# Patient Record
Sex: MALE | Race: WHITE | NOT HISPANIC OR LATINO | Employment: OTHER | URBAN - METROPOLITAN AREA
[De-identification: names, ages, dates, MRNs, and addresses within clinical notes are randomized per-mention and may not be internally consistent; named-entity substitution may affect disease eponyms.]

---

## 2020-03-17 ENCOUNTER — HISTORICAL (OUTPATIENT)
Dept: ADMINISTRATIVE | Facility: HOSPITAL | Age: 80
End: 2020-03-17

## 2020-03-24 ENCOUNTER — HISTORICAL (OUTPATIENT)
Dept: ADMINISTRATIVE | Facility: HOSPITAL | Age: 80
End: 2020-03-24

## 2020-04-01 ENCOUNTER — HISTORICAL (OUTPATIENT)
Dept: ADMINISTRATIVE | Facility: HOSPITAL | Age: 80
End: 2020-04-01

## 2020-04-08 ENCOUNTER — HISTORICAL (OUTPATIENT)
Dept: ADMINISTRATIVE | Facility: HOSPITAL | Age: 80
End: 2020-04-08

## 2020-04-15 ENCOUNTER — HISTORICAL (OUTPATIENT)
Dept: ADMINISTRATIVE | Facility: HOSPITAL | Age: 80
End: 2020-04-15

## 2020-04-29 ENCOUNTER — HISTORICAL (OUTPATIENT)
Dept: ADMINISTRATIVE | Facility: HOSPITAL | Age: 80
End: 2020-04-29

## 2020-05-13 ENCOUNTER — HISTORICAL (OUTPATIENT)
Dept: ADMINISTRATIVE | Facility: HOSPITAL | Age: 80
End: 2020-05-13

## 2021-01-13 ENCOUNTER — HISTORICAL (OUTPATIENT)
Dept: ADMINISTRATIVE | Facility: HOSPITAL | Age: 81
End: 2021-01-13

## 2021-01-20 ENCOUNTER — HISTORICAL (OUTPATIENT)
Dept: ADMINISTRATIVE | Facility: HOSPITAL | Age: 81
End: 2021-01-20

## 2021-08-21 ENCOUNTER — HOSPITAL ENCOUNTER (OUTPATIENT)
Dept: MEDSURG UNIT | Facility: HOSPITAL | Age: 81
End: 2021-08-24
Attending: STUDENT IN AN ORGANIZED HEALTH CARE EDUCATION/TRAINING PROGRAM | Admitting: PHYSICIAN ASSISTANT

## 2021-08-21 LAB
ABS NEUT (OLG): 7.45 X10(3)/MCL (ref 2.1–9.2)
ALBUMIN SERPL-MCNC: 3.5 GM/DL (ref 3.4–4.8)
ALBUMIN/GLOB SERPL: 1 RATIO (ref 1.1–2)
ALP SERPL-CCNC: 214 UNIT/L (ref 40–150)
ALT SERPL-CCNC: 24 UNIT/L (ref 0–55)
APPEARANCE, UA: CLEAR
APTT PPP: 35.8 SECOND(S) (ref 23.2–33.7)
AST SERPL-CCNC: 31 UNIT/L (ref 5–34)
BACTERIA SPEC CULT: ABNORMAL /HPF
BASOPHILS # BLD AUTO: 0 X10(3)/MCL (ref 0–0.2)
BASOPHILS NFR BLD AUTO: 0 %
BILIRUB SERPL-MCNC: 1.5 MG/DL
BILIRUB UR QL STRIP: ABNORMAL
BILIRUBIN DIRECT+TOT PNL SERPL-MCNC: 0.6 MG/DL (ref 0–0.5)
BILIRUBIN DIRECT+TOT PNL SERPL-MCNC: 0.9 MG/DL (ref 0–0.8)
BUN SERPL-MCNC: 18.3 MG/DL (ref 8.4–25.7)
CALCIUM SERPL-MCNC: 9.7 MG/DL (ref 8.8–10)
CHLORIDE SERPL-SCNC: 98 MMOL/L (ref 98–107)
CO2 SERPL-SCNC: 25 MMOL/L (ref 23–31)
COLOR UR: ABNORMAL
CREAT SERPL-MCNC: 1.27 MG/DL (ref 0.73–1.18)
EOSINOPHIL # BLD AUTO: 0.1 X10(3)/MCL (ref 0–0.9)
EOSINOPHIL NFR BLD AUTO: 1 %
ERYTHROCYTE [DISTWIDTH] IN BLOOD BY AUTOMATED COUNT: 14.8 % (ref 11.5–17)
GLOBULIN SER-MCNC: 3.5 GM/DL (ref 2.4–3.5)
GLUCOSE (UA): NEGATIVE
GLUCOSE SERPL-MCNC: 90 MG/DL (ref 82–115)
HCT VFR BLD AUTO: 41.5 % (ref 42–52)
HGB BLD-MCNC: 13.3 GM/DL (ref 14–18)
HGB UR QL STRIP: NEGATIVE
INR PPP: 1.2 (ref 0–1.3)
KETONES UR QL STRIP: NEGATIVE
LACTATE SERPL-SCNC: 0.8 MMOL/L (ref 0.5–2.2)
LEUKOCYTE ESTERASE UR QL STRIP: NEGATIVE
LIPASE SERPL-CCNC: 48 U/L
LYMPHOCYTES # BLD AUTO: 1.7 X10(3)/MCL (ref 0.6–4.6)
LYMPHOCYTES NFR BLD AUTO: 17 %
MCH RBC QN AUTO: 30.5 PG (ref 27–31)
MCHC RBC AUTO-ENTMCNC: 32 GM/DL (ref 33–36)
MCV RBC AUTO: 95.2 FL (ref 80–94)
MONOCYTES # BLD AUTO: 0.6 X10(3)/MCL (ref 0.1–1.3)
MONOCYTES NFR BLD AUTO: 6 %
NEUTROPHILS # BLD AUTO: 7.45 X10(3)/MCL (ref 2.1–9.2)
NEUTROPHILS NFR BLD AUTO: 75 %
NITRITE UR QL STRIP: NEGATIVE
PH UR STRIP: 5 [PH] (ref 5–9)
PLATELET # BLD AUTO: 213 X10(3)/MCL (ref 130–400)
PMV BLD AUTO: 11.2 FL (ref 9.4–12.4)
POTASSIUM SERPL-SCNC: 4.6 MMOL/L (ref 3.5–5.1)
PROT SERPL-MCNC: 7 GM/DL (ref 5.8–7.6)
PROT UR QL STRIP: NEGATIVE
PROTHROMBIN TIME: 15.1 SECOND(S) (ref 12.5–14.5)
RBC # BLD AUTO: 4.36 X10(6)/MCL (ref 4.7–6.1)
RBC #/AREA URNS HPF: ABNORMAL /[HPF]
SARS-COV-2 RNA RESP QL NAA+PROBE: NOT DETECTED
SODIUM SERPL-SCNC: 135 MMOL/L (ref 136–145)
SP GR UR STRIP: 1.02 (ref 1–1.03)
SQUAMOUS EPITHELIAL, UA: ABNORMAL /HPF (ref 0–4)
TROPONIN I SERPL-MCNC: <0.01 NG/ML (ref 0–0.04)
UROBILINOGEN UR STRIP-ACNC: 1
WBC # SPEC AUTO: 9.9 X10(3)/MCL (ref 4.5–11.5)
WBC #/AREA URNS HPF: ABNORMAL /HPF

## 2021-08-22 LAB
ABS NEUT (OLG): 5.05 X10(3)/MCL (ref 2.1–9.2)
ALBUMIN SERPL-MCNC: 3.1 GM/DL (ref 3.4–4.8)
ALBUMIN/GLOB SERPL: 0.8 RATIO (ref 1.1–2)
ALP SERPL-CCNC: 185 UNIT/L (ref 40–150)
ALT SERPL-CCNC: 19 UNIT/L (ref 0–55)
AST SERPL-CCNC: 24 UNIT/L (ref 5–34)
BASOPHILS # BLD AUTO: 0 X10(3)/MCL (ref 0–0.2)
BASOPHILS NFR BLD AUTO: 1 %
BILIRUB SERPL-MCNC: 1 MG/DL
BILIRUBIN DIRECT+TOT PNL SERPL-MCNC: 0.5 MG/DL (ref 0–0.5)
BILIRUBIN DIRECT+TOT PNL SERPL-MCNC: 0.5 MG/DL (ref 0–0.8)
BUN SERPL-MCNC: 18.2 MG/DL (ref 8.4–25.7)
CALCIUM SERPL-MCNC: 9.5 MG/DL (ref 8.8–10)
CHLORIDE SERPL-SCNC: 103 MMOL/L (ref 98–107)
CO2 SERPL-SCNC: 22 MMOL/L (ref 23–31)
CREAT SERPL-MCNC: 1.12 MG/DL (ref 0.73–1.18)
EOSINOPHIL # BLD AUTO: 0.2 X10(3)/MCL (ref 0–0.9)
EOSINOPHIL NFR BLD AUTO: 3 %
ERYTHROCYTE [DISTWIDTH] IN BLOOD BY AUTOMATED COUNT: 14.8 % (ref 11.5–17)
GLOBULIN SER-MCNC: 3.8 GM/DL (ref 2.4–3.5)
GLUCOSE SERPL-MCNC: 76 MG/DL (ref 82–115)
HCT VFR BLD AUTO: 38.5 % (ref 42–52)
HGB BLD-MCNC: 12.3 GM/DL (ref 14–18)
LYMPHOCYTES # BLD AUTO: 1.3 X10(3)/MCL (ref 0.6–4.6)
LYMPHOCYTES NFR BLD AUTO: 18 %
MAGNESIUM SERPL-MCNC: 2 MG/DL (ref 1.6–2.6)
MCH RBC QN AUTO: 30.6 PG (ref 27–31)
MCHC RBC AUTO-ENTMCNC: 31.9 GM/DL (ref 33–36)
MCV RBC AUTO: 95.8 FL (ref 80–94)
MONOCYTES # BLD AUTO: 0.5 X10(3)/MCL (ref 0.1–1.3)
MONOCYTES NFR BLD AUTO: 7 %
NEUTROPHILS # BLD AUTO: 5.05 X10(3)/MCL (ref 2.1–9.2)
NEUTROPHILS NFR BLD AUTO: 71 %
PHOSPHATE SERPL-MCNC: 2.6 MG/DL (ref 2.3–4.7)
PLATELET # BLD AUTO: 190 X10(3)/MCL (ref 130–400)
PMV BLD AUTO: 10.7 FL (ref 9.4–12.4)
POTASSIUM SERPL-SCNC: 4.5 MMOL/L (ref 3.5–5.1)
PROT SERPL-MCNC: 6.9 GM/DL (ref 5.8–7.6)
RBC # BLD AUTO: 4.02 X10(6)/MCL (ref 4.7–6.1)
SODIUM SERPL-SCNC: 137 MMOL/L (ref 136–145)
WBC # SPEC AUTO: 7.2 X10(3)/MCL (ref 4.5–11.5)

## 2021-08-23 LAB
ABS NEUT (OLG): 5.97 X10(3)/MCL (ref 2.1–9.2)
BASOPHILS # BLD AUTO: 0.1 X10(3)/MCL (ref 0–0.2)
BASOPHILS NFR BLD AUTO: 1 %
BUN SERPL-MCNC: 16 MG/DL (ref 8.4–25.7)
CALCIUM SERPL-MCNC: 9.6 MG/DL (ref 8.8–10)
CHLORIDE SERPL-SCNC: 102 MMOL/L (ref 98–107)
CO2 SERPL-SCNC: 25 MMOL/L (ref 23–31)
CREAT SERPL-MCNC: 1.09 MG/DL (ref 0.73–1.18)
CREAT/UREA NIT SERPL: 15
EOSINOPHIL # BLD AUTO: 0.4 X10(3)/MCL (ref 0–0.9)
EOSINOPHIL NFR BLD AUTO: 4 %
ERYTHROCYTE [DISTWIDTH] IN BLOOD BY AUTOMATED COUNT: 14.6 % (ref 11.5–17)
FOLATE SERPL-MCNC: 17.1 NG/ML (ref 7–31.4)
GLUCOSE SERPL-MCNC: 73 MG/DL (ref 82–115)
HCT VFR BLD AUTO: 40.8 % (ref 42–52)
HGB BLD-MCNC: 13 GM/DL (ref 14–18)
LYMPHOCYTES # BLD AUTO: 1.2 X10(3)/MCL (ref 0.6–4.6)
LYMPHOCYTES NFR BLD AUTO: 14 %
MAGNESIUM SERPL-MCNC: 2 MG/DL (ref 1.6–2.6)
MCH RBC QN AUTO: 30.4 PG (ref 27–31)
MCHC RBC AUTO-ENTMCNC: 31.9 GM/DL (ref 33–36)
MCV RBC AUTO: 95.3 FL (ref 80–94)
MONOCYTES # BLD AUTO: 0.6 X10(3)/MCL (ref 0.1–1.3)
MONOCYTES NFR BLD AUTO: 8 %
NEUTROPHILS # BLD AUTO: 5.97 X10(3)/MCL (ref 2.1–9.2)
NEUTROPHILS NFR BLD AUTO: 73 %
PLATELET # BLD AUTO: 241 X10(3)/MCL (ref 130–400)
PMV BLD AUTO: 10.6 FL (ref 9.4–12.4)
POTASSIUM SERPL-SCNC: 4.4 MMOL/L (ref 3.5–5.1)
RBC # BLD AUTO: 4.28 X10(6)/MCL (ref 4.7–6.1)
SODIUM SERPL-SCNC: 136 MMOL/L (ref 136–145)
TSH SERPL-ACNC: 2.57 UIU/ML (ref 0.35–4.94)
WBC # SPEC AUTO: 8.2 X10(3)/MCL (ref 4.5–11.5)

## 2021-08-26 LAB — FINAL CULTURE: NORMAL

## 2021-08-27 LAB — FINAL CULTURE: NORMAL

## 2021-10-19 ENCOUNTER — HISTORICAL (OUTPATIENT)
Dept: ADMINISTRATIVE | Facility: HOSPITAL | Age: 81
End: 2021-10-19

## 2021-10-19 LAB — SARS-COV-2 RNA RESP QL NAA+PROBE: NEGATIVE

## 2022-04-30 NOTE — ED PROVIDER NOTES
Patient:   Jovany Reynolds            MRN: [id number]            FIN: [id number]               Age:   80 years     Sex:  Male     :  1940   Associated Diagnoses:   Pneumoperitoneum; Diverticulosis; Abdominal pain   Author:   Eduardo NUNES, Andrew BARRIOS      Report Summary       General:       Alert, no acute distress.       Basic Information   Time seen: Date & time 2021 12:36:00.   History source: Patient.   Arrival mode: Private vehicle.   History limitation: None.   Additional information: Chief Complaint from Nursing Triage Note : Chief Complaint   2021 12:28 CDT      Chief Complaint           c/o abdominal pain that started lower right abdominal pain that has moved around middle abdomen.  Pt had colonoscopy thursday with increased pain after.    2021 11:37 CDT      Chief Complaint           abdominal pain after colonscopy thursday (constant pain)  .      History of Present Illness   The patient presents with 80-year-old  male with a history of CVA in  and aortic valve replacement in 2017 with quadruple bypass surgery, presents to the emergency department with wife complaining of increased lower abdominal pain post colonoscopy on Thursday (2021) with Dr. Tino Anguiano.  Initial Oxygen saturation in triage is 91% on room air.  Patient denies any respiratory complaints at this time.  Patient placed on 2 L nasal cannula which increased oxygen saturation to 95%.  Wife reports patient takes a baby aspirin daily ~Sort note by SHARYN Bowen NP.  The onset was 3  days ago.  The course/duration of symptoms is constant and worsening.  The character of symptoms is dull and pressure.  The degree at onset was minimal.  The Location of pain at onset was right, lower and abdominal.  The degree at present is severe.  The Location of pain at present is mid epigastric.  Radiating pain: none. The exacerbating factor is eating.  There are relieving factors including analgesics and antacids.  Therapy  today: none.  Risk factors consist of age and recent surgery.  Associated symptoms: shortness of breath, denies nausea, denies vomiting and denies diarrhea.  80-year-old male with a prior history of CVA as well as status post AVR in 2017 had four-vessel CABG here 3 days after recent colonoscopy with Dr. Tino Jensen with gastro noting increasing abdominal pain.  He states that his first bowel movement was very small and hard yesterday and has had gas last night but notes that the pain started right lower quadrant just after the colonoscopy and has moved periumbilical since then.  Denies any nausea, vomiting or fever.  Denies any prior abdominal surgeries.  States the pain improves only temporarily with Gas-X and Tylenol.  Notably, his oxygen was at 91% on arrival and denies any chest pain but does note some difficulty taking deep breaths secondary to pain on inspiration in the abdomen..        Review of Systems   Constitutional symptoms:  Negative except as documented in HPI.   Skin symptoms:  Negative except as documented in HPI.   Eye symptoms:  Negative except as documented in HPI.   ENMT symptoms:  Negative except as documented in HPI.   Respiratory symptoms:  Negative except as documented in HPI.   Cardiovascular symptoms:  Negative except as documented in HPI.   Gastrointestinal symptoms:  Negative except as documented in HPI.   Genitourinary symptoms:  Negative except as documented in HPI.   Musculoskeletal symptoms:  Negative except as documented in HPI.   Neurologic symptoms:  Negative except as documented in HPI.   Psychiatric symptoms:  Negative except as documented in HPI.   Endocrine symptoms:  Negative except as documented in HPI.   Hematologic/Lymphatic symptoms:  Negative except as documented in HPI.   Allergy/immunologic symptoms:  Negative except as documented in HPI.      Health Status   Allergies:    Allergic Reactions (Selected)  No Known Allergies,    Allergies (1) Active Reaction  No Known  Allergies None Documented  .   Medications:  (Selected)   Inpatient Medications  Ordered  Zofran 2 mg/mL injectable solution: 4 mg, form: Injection, IV Push, Once, first dose 08/21/21 12:32:00 CDT, stop date 08/21/21 12:32:00 CDT, STAT  morphine 4 mg/mL preservative-free intravenous solution: 2 mg, form: Injection, IV Push, Once, first dose 08/21/21 12:32:00 CDT, stop date 08/21/21 12:32:00 CDT, STAT, ( > 7 on pain scale)  Prescriptions  Prescribed  ASPIRIN 81MG EC LOW DOSE D/F TABS: See Instructions, TAKE 1 TABLET BY MOUTH EVERY DAY, # 120 tab(s), 3 Refill(s), eRx: [facility] [id number]  Lexapro 10 mg oral tablet: 10 mg = 1 tab(s), Oral, Daily, # 30 tab(s), 1 Refill(s), Pharmacy: [facility] [id number]  cefadroxil 500 mg oral capsule: 500 mg = 1 cap(s), Oral, q12hr, # 20 cap(s), 0 Refill(s), Pharmacy: [facility] #[id number], 163, cm, Height/Length Dosing, 06/26/19 15:31:00 CDT, 68.5, kg, Weight Dosing, 06/26/19 15:31:00 CDT  ezetimibe 10 mg oral tablet: See Instructions, TAKE 1 TABLET BY MOUTH DAILY, # 30 tab(s), 11 Refill(s), eRx: [facility] #[id number]  gabapentin 100 mg oral capsule: 100 mg = 1 cap(s), Oral, BID, PRN PRN pain, moderate, for sciatica pain, # 28 cap(s), 0 Refill(s), Pharmacy: [facility] [id number]  levothyroxine 50 mcg (0.05 mg) oral tablet: See Instructions, TAKE 1 TABLET BY MOUTH DAILY, # 30 tab(s), 0 Refill(s), Pharmacy: [facility] #[id number], 163, cm, Height/Length Dosing, 06/26/19 15:31:00 CDT, 68.5, kg, Weight Dosing, 06/26/19 15:31:00 CDT  traZODONE 150 mg oral tab ( Desyrel ): See Instructions, TAKE 1/3- 1 TABLET BY MOUTH AT BEDTIME, # 30 tab(s), 11 Refill(s), Pharmacy: [facility] [id number]  Documented Medications  Documented  ASPIRIN 81MG EC LOW DOSE D/F TABS: 81 mg = 1 tab(s), Oral, Daily  CLOPIDOGREL 75MG TABLETS: 75 mg = 1 tab(s), Oral, Daily  CoQ10 300 mg oral capsule: 600 mg = 2 cap(s), Oral, Daily, # 100 cap(s), 0 Refill(s)  FUROSEMIDE 40MG  TABLETS: 40 mg = 1 tab(s), Oral, Daily  METOPROLOL ER SUCCINATE 25MG TABS: 25 mg = 1 tab(s), Oral, Daily  RAPAFLO 8 MG CAPSULE: 8 mg = 1 cap(s), Oral, Daily  Vitamin D3 400 intl units oral tablet: 400 IntUnit = 1 tab(s), Oral, Daily, 0 Refill(s)  dicyclomine 10 mg oral capsule: 10 mg = 1 cap(s), Oral, q12hr  memantine 10 mg oral tablet: 10 mg = 1 tab(s), Oral, BID  traZODONE 50 mg oral tablet ( Desyrel ): 75 mg = 1.5 tab(s), Oral, qPM.      Past Medical/ Family/ Social History   Medical history:    Resolved  Need for pneumococcal vaccine ([id number]):  Resolved..   Surgical history:    Colonoscopy ([id number]) on 8/19/2021 at 80 Years.  Carotid endarterectomy ([id number]) in 2015 at 75 Years.  Colonoscopy ([id number]) in the month of 10/2014 at 73 Years.  Bilateral replacement of knee joints ([id number]) in 2011 at 71 Years.  Arthroplasty of right shoulder ([id number]) in 2003 at 63 Years.  left shoulder surgery in 2003 at 63 Years..   Family history:    CAD - Coronary artery disease  Father  Mother  .      Physical Examination               Vital Signs   Vital Signs   8/21/2021 12:28 CDT      Temperature Oral          37.0 DegC                             Temperature Oral (calculated)             98.60 DegF                             Peripheral Pulse Rate     90 bpm                             Respiratory Rate          18 br/min                             SpO2                      91 %  LOW                             Oxygen Therapy            Room air, Nasal cannula                             Oxygen Flow Rate          2 L/min                             Systolic Blood Pressure   100 mmHg                             Diastolic Blood Pressure  63 mmHg    8/21/2021 11:37 CDT      Temperature Tympanic      36.9 DegC                             Peripheral Pulse Rate     68 bpm                             Respiratory Rate          18 br/min                             SpO2                      98 %                              Oxygen Therapy            Room air                             Systolic Blood Pressure   98 mmHg                             Diastolic Blood Pressure  76 mmHg                             Blood Pressure Location   Left arm                             Manual Cuff BP            No  .   Oxygen saturation.   General:  Alert, no acute distress.    Skin:  Warm, dry, no rash.    Head:  Normocephalic, atraumatic.    Neck:  Supple, trachea midline, no JVD.    Eye:  Pupils are equal, round and reactive to light, extraocular movements are intact, normal conjunctiva, vision unchanged.    Ears, nose, mouth and throat:  Tympanic membranes clear, oral mucosa moist, no pharyngeal erythema or exudate.    Cardiovascular:  Regular rate and rhythm, No murmur, No edema.    Respiratory:  Lungs are clear to auscultation, respirations are non-labored, breath sounds are equal, Symmetrical chest wall expansion.    Chest wall:  No tenderness, sternotomy scar present.    Back:  Normal range of motion.   Musculoskeletal:  Normal ROM, normal strength, no tenderness.    Gastrointestinal:  Soft, Non distended, No organomegaly, Tenderness: Moderate, epigastric, periumbilical, Guarding: Minimal, Rebound: Negative, Bowel sounds: Normal.    Neurological:  Alert and oriented to person, place, time, and situation, No focal neurological deficit observed, CN II-XII intact, normal sensory observed, normal motor observed, normal speech observed.    Psychiatric:  Cooperative, appropriate mood & affect, normal judgment.             Medical Decision Making   Differential Diagnosis:  Abdominal pain, bowel perforation, abdominal aortic aneurysm.    Rationale:  Elderly male 4 days status post colonoscopy with initially rather proximal abdominal pain which has changed to periumbilical of which she has moderately tender in this area on exam.  Normal bowel sounds and does note recent flatus.  Vital signs with some mild hypoxia stating that it hurts to take a  deep breath but not tachycardic, labs are no leukocytosis.  No lactic acid and no EKG so mesenteric ischemia less likely.  Rest of his labs relatively unremarkable.  There was some note on the CT of pneumoperitoneum of which surgical hospitalist was able to evaluate patient and will do repeat abdominal exams as consult with admission to hospitalist.  Else any evidence of urinary tract infection because there was some possible note of possible bladder diverticulum/fistula.  Covid negative.  Given a dose of morphine and Zofran for pain control as well as Zosyn for possible infection..   Electrocardiogram:  Time 8/21/2021 13:09:00, rate 63, Sinus rhythm with first-degree AV block, right bundle-branch block, anterior ST depression without evidence of significant ST elevation..       Impression and Plan   Diagnosis   Pneumoperitoneum (RWL54-RN K66.8)   Diverticulosis (RVQ11-SM K57.90)   Abdominal pain (PNED [id number])      Calls-Consults   -  Surgical hospitalist at 1518  Hospitalist regarding admission at 1623.   Plan   Condition: Improved, Stable.    Counseled: Patient, Family, Regarding diagnosis, Regarding diagnostic results, Regarding treatment plan, Patient indicated understanding of instructions.

## 2022-05-04 NOTE — HISTORICAL [facility] CERNER
This is a historical note converted from Cerday. Formatting and pictures may have been removed.  Please reference Cerday for original formatting and attached multimedia. Chief Complaint  c/o abdominal pain that started lower right abdominal pain that has moved around middle abdomen. ?Pt had colonoscopy thursday with increased pain after.  Reason for Consultation  Scant pneumoperitoneum on CT  History of Present Illness  Jovany Reynolds is an 81 yo M w/ history of diverticulosis who presented to the ED today w/ abdominal pain since having a colonoscopy w/ Dr. Anguiano 3 days ago. Colonoscopy was significant for sigmoid diverticulosis and a colonic loop that was difficult to traverse endoscopically requiring significant insufflation to do so. He reports an episode of what he believes was diverticulitis about a year ago, where he had abdominal pain that resolved w/ antibiotics given by his PCP. He reports the abdominal pain was initially localized to the RLQ, and is now primarily suprapubic. He denies any fever or chills, nausea or vomiting, or difficulty w/?PO intake at home. Has been having normal BM since the colonoscopy. Denies any blood in his stool. Denies history of UTI or burning w/ urination. Does endorse some pain w/ urination that he relates to his prostate. No history of previous abdominal surgeries.  ?  CT scan was obtained in ED showing scant pneumoperitoneum in the RUQ, bladder wall thickening, possible fistulous tract from the underside of the sigmoid to posterior wall of the bladder, colonic diverticulosis, but no diverticulitis. Patient afebrile, not tachycardic, w/ normal WBC and UA negative for nitrites, LE, or bacteria.  Review of Systems  Negative except as mentioned above  Physical Exam  Vitals & Measurements  T:?37.0? ?C (Oral)? HR:?62(Peripheral)? HR:?61(Monitored)? RR:?18? BP:?93/59? SpO2:?97%? WT:?68?kg?  Gen: NAD  Neuro: No focal deficit, R CEA scar well healed  Card: RR  Pulm: NWOB on 2L  NC  GI: S,?ND, mildly tender to palpation in suprapubic region  MS: Moves all extremities  Integ: WWP, no obvious lesions?  Assessment/Plan  Jovany Reynolds is an 81 yo M w/ history of diverticulosis s/p colonoscopy 3 days ago w/ scant pneumoperitoneum and possible fistulous tract from sigmoid to bladder on CT.  ?   -Case discussed w/ Dr. Anguiano, he believes scant pneumoperitoneum likely secondary to colonoscopy requiring use of significant insufflation  -Patient afebrile, hemodynamically stable w/o leukocytosis, abdominal exam benign at this time, no need for acute surgical intervention  -Patient may require f/u w/ colorectal as outpatient for possible colovesical fistula, has seen Dr. Lopez in the past for hemorrhoids, UA w/o evidence of current infection, denies previous UTIs  -No evidence of acute diverticulitis  -Please call if patient condition changes and surgical need arises  ?   Lj Fuchs MD  [employer] [facility], PGY-1  ?   Agree with above assessment and plan.   Problem List/Past Medical History  Ongoing  Atrial fibrillation  CAD - Coronary artery disease  CVA - Cerebrovascular accident  Depression  Generalized anxiety disorder  Heart murmur  History of shoulder surgery  Hypothyroidism  Insomnia  MDD (major depressive disorder)  Need for influenza vaccination  Short term memory loss  Vitamin B deficiency  Vitamin D deficiency  Procedure/Surgical History  Incision and drainage of hematoma, seroma or fluid collection (03/17/2020)  Carotid endarterectomy (2015)  Colonoscopy (10.2014)  Bilateral replacement of knee joints (2011)  Arthroplasty of right shoulder (2003)  left shoulder surgery (2003)   Medications  Home  ASPIRIN 81MG EC LOW DOSE D/F TABS, 81 mg= 1 tab(s), Oral, Daily  CoQ10 300 mg oral capsule, 600 mg= 2 cap(s), Oral, Daily  dicyclomine 10 mg oral capsule, 10 mg= 1 cap(s), Oral, q12hr  ezetimibe 10 mg oral tablet, See Instructions, 11 refills  FUROSEMIDE 40MG TABLETS, 40 mg= 1 tab(s), Oral,  Daily  levothyroxine 50 mcg (0.05 mg) oral tablet, See Instructions  Lexapro 10 mg oral tablet, 10 mg= 1 tab(s), Oral, Daily, 1 refills  memantine 10 mg oral tablet, 10 mg= 1 tab(s), Oral, BID  METOPROLOL ER SUCCINATE 25MG TABS, 25 mg= 1 tab(s), Oral, Daily  RAPAFLO 8 MG CAPSULE, 8 mg= 1 cap(s), Oral, Daily  traZODONE 50 mg oral tablet ( Desyrel ), 75 mg= 1.5 tab(s), Oral, qPM  Vitamin D3 400 intl units oral tablet, 400 IntUnit= 1 tab(s), Oral, Daily  Allergies  No Known Allergies  Social History  Alcohol  Current, Beer, 1-2 times per month, 08/03/2018  Substance Use  Never, 08/03/2018  Tobacco  Never (less than 100 in lifetime), No, 08/21/2021  Family History  CAD - Coronary artery disease: Mother and Father.  Lab Results  Cr 1.27  WBC 9.9  UA w/ negative nitrites, LE, bacteria  Diagnostic Results  CT A/P: Small volume scattered pneumoperitoneum, greatest in the right upper quadrant. No free fluid.  Bladder wall thickening may relate to underdistention and/or infection. Colovesicular fistula is a possibility. Colonic diverticulosis. No diverticulitis.?      36.9

## 2022-05-04 NOTE — HISTORICAL [facility] CERNER
This is a historical note converted from Cerday. Formatting and pictures may have been removed.  Please reference Cerday for original formatting and attached multimedia. Chief Complaint  c/o abdominal pain that started lower right abdominal pain that has moved around middle abdomen. ?Pt had colonoscopy thursday with increased pain after.  Reason for Consultation  abdominal pain post colonoscopy with abnormal CT scan findings  History of Present Illness  80 year old male who presented to the ED after being seen at urgent care for abdominal pain post colonoscopy.? Colonoscopy done by me 8/19/2021.? Patient with notably tortuous sigmoid colon with a sharp angulation noted in the sigmoid colon around area of diverticula, with scaring and evidence of previous diverticulitis.? Patient did not have pain post procedure, but notes several hours after the procedure he had a dull right lower quadrant pain, which escalated in intensity after eating, but after a few hours the pain did begin to improve.? Today, he reports that the pain was no longer at the right lower quadrant, but located in the area just below his umbilicus, no radiation, associated with decreased urination.? Denies dysuria or hematuria.? He has had a bowel movement since colonoscopy and is passing flatus.? he denies fever, chills, nausea, vomiting.? He does not have a decreased appetite.? He does describe prior episodes of diverticulitis and he did have one he thinks about 1 year prior, this was diagnosed clinically by his PCP at the time and he did a course of antibiotics.?  Review of Systems  Constitutional:?no fever, fatigue  Eye:?no vision loss, eye redness, drainage, or pain  Respiratory:?no cough, no shortness of breath  Cardiovascular:?no chest pain, no palpitations  Gastrointestinal:?no nausea, vomiting, or diarrhea.?+ abdominal pain  Genitourinary:?no dysuria, no urinary frequency, + decreased urination  Endocrine:?no heat or cold  intolerance  Musculoskeletal:?no muscle or joint pain, no joint swelling  Integumentary:?no skin rash  Physical Exam  Vitals & Measurements  T:?37.0? ?C (Oral)? HR:?62(Peripheral)? HR:?61(Monitored)? RR:?18? BP:?93/59? SpO2:?97%? WT:?68?kg?  General:?well-developed well-nourished in no acute distress  Eye: EOMI, clear conjunctiva, eyelids normal  Respiratory:?clear to auscultation bilaterally  Cardiovascular:?regular rate and rhythm without murmurs  Gastrointestinal:?soft, non-distended with normal bowel sounds, mild suprapubic tenderness, no rebound tenderness or guarding  Neurologic: cranial nerves intact, grossly  Assessment/Plan  Abdominal pain?[id number]  As noted above, pain post colonoscopy, no fever or leukocytosis with normal lactic acid,?CT scan with 2 significant findings, including trace amount of air in the peritoneum, which is likely secondary to colonoscopy exam, however, his current pain is more consistent with cystitis noted on imaging and there is also concern for a colovesicular fistula, which I suspect is a chronic finding given his diverticula history and scaring/angulation noted at the?area of sigmoid diverticula seen during colonoscopy, with lack of inflammation seen at this site during colonoscopy and on current CT scan.? Agree with surgical consultation and surgical intern was present during my encounter.? Would keep patient NPO and agree with broad spectrum antibiotics, along with checking?blood and urine cultures.? He will need close observation over the next 24-48 hours with outpatient follow up for possible colovesicular fistula.? There were no polyps found?during colonoscopy and one hemorrhoid band was placed for internal hemorrhoids.? Please call with any questions or concerns.   Problem List/Past Medical History  Ongoing  Abnormal blood sugar  Atrial fibrillation  CAD - Coronary artery disease  CVA - Cerebrovascular accident  Depression  Generalized anxiety  disorder  Heart murmur  History of shoulder surgery  Hypothyroidism  Insomnia  MDD (major depressive disorder)  Need for influenza vaccination  Short term memory loss  Vitamin B deficiency  Vitamin D deficiency  Historical  Need for pneumococcal vaccine  Procedure/Surgical History  Colonoscopy (08/19/2021)  Debridement (eg, high pressure waterjet with/without suction, sharp selective debridement with scissors, scalpel and forceps), open wound, (eg, fibrin, devitalized epidermis and/or dermis, exudate, debris, biofilm), including topical application(s), wound (01/20/2021)  Extraction of Chest Skin, External Approach (01/20/2021)  Debridement (eg, high pressure waterjet with/without suction, sharp selective debridement with scissors, scalpel and forceps), open wound, (eg, fibrin, devitalized epidermis and/or dermis, exudate, debris, biofilm), including topical application(s), wound (01/13/2021)  Extraction of Chest Skin, External Approach (01/13/2021)  Debridement, subcutaneous tissue (includes epidermis and dermis, if performed); first 20 sq cm or less (04/08/2020)  Excision of Left Lower Leg Subcutaneous Tissue and Fascia, Open Approach (04/08/2020)  Debridement, subcutaneous tissue (includes epidermis and dermis, if performed); first 20 sq cm or less (04/01/2020)  Excision of Left Lower Leg Subcutaneous Tissue and Fascia, Open Approach (04/01/2020)  Debridement (eg, high pressure waterjet with/without suction, sharp selective debridement with scissors, scalpel and forceps), open wound, (eg, fibrin, devitalized epidermis and/or dermis, exudate, debris, biofilm), including topical application(s), wound (03/24/2020)  Extraction of Left Lower Leg Skin, External Approach (03/24/2020)  Drainage of Left Lower Leg Skin, External Approach (03/17/2020)  Incision and drainage of hematoma, seroma or fluid collection (03/17/2020)  Carotid endarterectomy (2015)  Colonoscopy (10.2014)  Bilateral replacement of knee joints  (2011)  Arthroplasty of right shoulder (2003)  left shoulder surgery (2003)   Medications  Inpatient  Zosyn 3.375 gm (for IVPB)  Home  ASPIRIN 81MG EC LOW DOSE D/F TABS, 81 mg= 1 tab(s), Oral, Daily  ASPIRIN 81MG EC LOW DOSE D/F TABS, See Instructions, 3 refills,? ?Not taking: duplicate  cefadroxil 500 mg oral capsule, 500 mg= 1 cap(s), Oral, q12hr,? ?Not taking  CoQ10 300 mg oral capsule, 600 mg= 2 cap(s), Oral, Daily  dicyclomine 10 mg oral capsule, 10 mg= 1 cap(s), Oral, q12hr  ezetimibe 10 mg oral tablet, See Instructions, 11 refills  FUROSEMIDE 40MG TABLETS, 40 mg= 1 tab(s), Oral, Daily  gabapentin 100 mg oral capsule, 100 mg= 1 cap(s), Oral, BID, PRN,? ?Not taking  levothyroxine 50 mcg (0.05 mg) oral tablet, See Instructions  Lexapro 10 mg oral tablet, 10 mg= 1 tab(s), Oral, Daily, 1 refills  memantine 10 mg oral tablet, 10 mg= 1 tab(s), Oral, BID  METOPROLOL ER SUCCINATE 25MG TABS, 25 mg= 1 tab(s), Oral, Daily  RAPAFLO 8 MG CAPSULE, 8 mg= 1 cap(s), Oral, Daily  traZODONE 150 mg oral tab ( Desyrel ), See Instructions, 11 refills  traZODONE 50 mg oral tablet ( Desyrel ), 75 mg= 1.5 tab(s), Oral, qPM  Vitamin D3 400 intl units oral tablet, 400 IntUnit= 1 tab(s), Oral, Daily  Allergies  No Known Allergies  Social History  Abuse/Neglect  No, 08/21/2021  No, 06/26/2019  Alcohol  Current, Beer, 1-2 times per month, 08/03/2018  Employment/School  Employed, Work/School description: Self emplyed, [employer] 08/03/2018  Home/Environment  Lives with Spouse. Living situation: Home/Independent., 08/03/2018  Nutrition/Health  Regular, 08/03/2018  Sexual  Sexually active: Yes., 09/12/2018  Substance Use  Never, 08/03/2018  Tobacco  Never (less than 100 in lifetime), No, 08/21/2021  Never (less than 100 in lifetime), N/A, 06/26/2019  Family History  CAD - Coronary artery disease: Mother and Father.  Immunizations  Vaccine Date Status   pneumococcal 13-valent conjugate vaccine 09/12/2018 Given   zoster vaccine, inactivated  09/11/2018 Recorded   tetanus-diphtheria toxoids 11/27/2017 Recorded   zoster vaccine live 11/12/2014 Recorded

## 2022-05-04 NOTE — HISTORICAL [facility]
This is a historical note converted from [employer]. Formatting and pictures may have been removed.  Please reference [employer] for original formatting and attached multimedia. 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  Immunizations  No immunizations recorded for this visit.  Hospital Course  Subjective  Patient is an 80-year-old  male with a medical history of?atrial fibrillation, coronary artery disease, hypothyroidism and prior CVA?who presents to the ED with complaints of?abdominal pain.?Abdominal pain has been present since colonoscopy on 8/19/2021 with Dr. Anguiano. Pain originally located to the RLQ but has progressed to the periumbilical region. He reports flatus, shortness of breath and decreased urination.? He denies fever, nausea, vomiting and diarrhea. Colonoscopy reveled tortuous sigmoid colon with sharp angulation in the area of diverticula,?internal hemorrhoid which was banded.  Upon presentation to the ED? he was afebrile and hemodynamically stable.?Oxygen saturation 91% room air? and?placed on 2 L nasal cannula with improvement. Since he has been weaned to room air. Labs revealed WBC 9.9, lactic acid 0.8 lipase 48, troponin less than 0.01, creatinine 1.27, direct bilirubin 0.6, indirect bilirubin 0.90, PT 15.1 and PTT 35.8. Other lab indices of the CBC and CMP were overall unremarkable.? UA negative for infectious process. SARS-CoV-2 PCR negative.? EKG normal sinus rhythm.? CT abdomen  pelvis significant for pneumoperitoneum concerning for perforated viscus, bladder wall thickening related to underdistention and/or infection, colovesicular fistula possibility and colonic diverticulosis without diverticulitis. Blood cultures pending.? He received morphine and Zosyn in the ED.? Surgical hospitalist and GI was consulted and planning for repeat abdominal exams and broad spectrum antibiotics. Patient is admitted to hospital medicine services for further medical management.  Pt was Admitted as inpatient to our service on 8/21  Surgical hospitalist consulted?on admission--> no emergent intervention indicated at this time, appreciate recs  GI Dr JAKI Anguiano consulted. recommended d/c on?Augmentin for 7 days. Appreciate recommendations  Continue with Zosyn while inhouse, plan for d/c on augmentin x 7 days  2/2 blood cx - positive for Gram Positive Cocci probable Staphylococcus Warneri, Briefly discussed with our ID, probably contamination, Pt remained afebrile, no leukocytosis, no chills while in house. Pt did received 2 days of vanc empirically given his prosthetic valve  Echo reviewed  Repeat Blood cx 8/22- negtiave  Pt is stable to be discharged home today.  ?   Pt was seen and examined on the day of discharge  Vital signs reviewed  Vitals can be found below  General: In no acute distress, afebrile  Chest: Clear to auscultation bilaterally?anteriorly  Heart: S1, S2, 2/6 systolic murmur  Abdomen: mild guarding on palpation?of upper abdomen. BS +ve x4  MSK: Warm, no lower extremity edema, no clubbing or cyanosis  Neurologic: Alert and oriented x4, moving all extremities with good strength  ?   Explained in detail to the patient about the discharge plan, medications, and follow-up?visits.? Pt understand and agree with the treatment plan.  Condition stable  Diet- cardiac  Medications Per DC med rec  Activities as tolerated  Follow-up with PCP in 1 to 2 weeks  F/U with Dr Tino Chance in 10-14 days  For further  questions contact hospitalist office  ?   Discharge time 36 minutes   Discharge Medication Reconciliation  Prescribed  amoxicillin-clavulanate (Augmentin 875 mg-125 mg oral tablet)?1 tab(s), Oral, q12hr  traZODone (traZODONE 50 mg oral tablet ( Desyrel ))?50 mg, Oral, Once a day (at bedtime)  Continue  Misc Prescription (ASPIRIN 81MG EC LOW DOSE D/F TABS)?See Instructions  aspirin (ASPIRIN 81MG EC LOW DOSE D/F TABS)?81 mg, Oral, Daily  cholecalciferol (Vitamin D3 400 intl units oral tablet)?400 IntUnit, Oral, Daily  escitalopram (Lexapro 10 mg oral tablet)?10 mg, Oral, Daily  ezetimibe (ezetimibe 10 mg oral tablet)?See Instructions  furosemide (FUROSEMIDE 40MG TABLETS)?40 mg, Oral, Daily  gabapentin (gabapentin 100 mg oral capsule)?100 mg, Oral, BID, PRN pain, moderate  levothyroxine (levothyroxine 50 mcg (0.05 mg) oral tablet)?See Instructions  memantine (memantine 10 mg oral tablet)?10 mg, Oral, BID  metoprolol (METOPROLOL ER SUCCINATE 25MG TABS)?25 mg, Oral, Daily  silodosin (RAPAFLO 8 MG CAPSULE)?8 mg, Oral, Daily  Discontinue  cefadroxil (cefadroxil 500 mg oral capsule)?500 mg, Oral, q12hr  clopidogrel (CLOPIDOGREL 75MG TABLETS)?75 mg, Oral, Daily  dicyclomine (dicyclomine 10 mg oral capsule)?10 mg, Oral, q12hr  traZODone (traZODONE 150 mg oral tab ( Desyrel ))?See Instructions  traZODone (traZODONE 50 mg oral tablet ( Desyrel ))?75 mg, Oral, qPM  ubiquinone (CoQ10 300 mg oral capsule)?600 mg, Oral, Daily  Education and Orders Provided  Hemorrhoids  Diverticulosis  Discharge - 08/24/21 7:48:00 CDT, Home, Give all scheduled vaccinations prior to discharge.?  Discharge Activity - Activity as Tolerated?  Discharge Diet - Cardiac?  Follow up  Aj Gomez MD, within 2 to 4 weeks  Tino Anguiano, on 09/08/2021  Car Seat Challenge  No Qualifying Data

## 2022-05-04 NOTE — HISTORICAL [facility]
This is a historical note converted from [employer]. Formatting and pictures may have been removed.  Please reference [employer] for original formatting and attached multimedia. Chief Complaint  c/o abdominal pain that started lower right abdominal pain that has moved around middle abdomen. ?Pt had colonoscopy thursday with increased pain after.  History of Present Illness  Patient is an 80-year-old  male with a medical history of?atrial fibrillation, coronary artery disease, hypothyroidism and prior CVA?who presents to the ED with complaints of?abdominal pain.?Abdominal pain has been present since colonoscopy on 8/19/2021 with Dr. Anguiano. Pain originally located to the RLQ but has progressed to the periumbilical region. He reports flatus, shortness of breath and decreased urination.? He denies fever, nausea, vomiting and diarrhea. Colonoscopy reveled tortuous sigmoid colon with sharp angulation in the area of diverticula,?internal hemorrhoid which was banded.  Upon presentation to the ED? he was afebrile and hemodynamically stable.?Oxygen saturation 91% room air? and?placed on 2 L nasal cannula with improvement. Since he has been weaned to room air. Labs revealed WBC 9.9, lactic acid 0.8 lipase 48, troponin less than 0.01, creatinine 1.27, direct bilirubin 0.6, indirect bilirubin 0.90, PT 15.1 and PTT 35.8. Other lab indices of the CBC and CMP were overall unremarkable.? UA negative for infectious process. SARS-CoV-2 PCR negative.? EKG normal sinus rhythm.? CT abdomen pelvis significant for pneumoperitoneum concerning for perforated viscus, bladder wall thickening related to underdistention and/or infection, colovesicular fistula possibility and colonic diverticulosis without diverticulitis. Blood cultures pending.? He received morphine and Zosyn in the ED.? Surgical hospitalist and GI was consulted and planning for repeat abdominal exams and broad spectrum antibiotics. Patient is admitted to hospital medicine  services for further medical management.  Review of Systems  Except as documented all systems reviewed and negative  Physical Exam  Vitals & Measurements  T:?37.0? ?C (Oral)? HR:?62(Peripheral)? HR:?61(Monitored)? RR:?18? BP:?93/59? SpO2:?97%? WT:?68?kg?  General:?NAD, nontoxic appearing  Eye: PERRLA clear conjunctiva  HENT:?moist mucus membranes, normocephalic  Neck: full range of motion  Respiratory:?clear to auscultation bilaterally, symmetrical chest expansion, unlabored respirations  Cardiovascular:?regular rate and rhythm, brisk capillary refill  Gastrointestinal:?soft, non-distended, active bowel sounds  Genitourinary: no CVA tenderness to palpation  Musculoskeletal:?full range of motion of all extremities  Integumentary: warm and dry  Neurologic: AAOx3, cranial nerves intact, no focal deficits  Psych: calm, cooperative,?good eye contact, normal cognition  Assessment/Plan  IMPRESSION:  Pneumoperitoneum - likely secondary to colonoscopy  Sigmoid Colon Diverticula  Colovesicular Fistula  Abdominal Pain  Hx: Atrial Fibrillation, CAD, Hypothyroidism, AVR  ?  ?   PLAN:  - Surgical hospitalist and GI consulted. Appreciate recommendations  - Continue with Zosyn  - Follow results of blood and urine cultures  - NPO  - Zofran as needed  - Labs in AM  ?  ?   DVT Prophylaxis: SCDs  ?  ?   Source of history: Patient and medical records  Referral source: ED  History limitation: None  ?  ?  PCP: Aj Gomez MD  ?  ?  ?  I, MARLENA Klein, reviewed and discussed the case with Dr. CHERIE Martin. See addendum for further per MD.?  ?  ?  **************************************ADDENDUM******************************************  I Dr. Martin?assumed care of this patient today?8/21/21.This patient was admitted to observation under my care. For this patient encounter I have reviewed the nurse practitioners documentation, treatment plan and medical decision making. ?I have had a face-to-face with this patient.? I  have?reviewed?all?medical records as made available?and pertinent to?this admission.  Patient tells me that his abdominal pain is more generalized. He has not been eating.? No nausea no vomiting no chest pain.? No shortness of breath.  Regular heart rate no peripheral edema. ?Sitting upright, abdominal tenderness to touch bowel sounds present?no rebound no guarding.? Moist mucous membranes?no petechia no scleral icterus?nonjaundiced.. ?No peripheral edema.  N.p.o.?awaiting surgerys recommendations. ?We will follow closely. ?Lactic acid normal?we will continue to monitor.? Continue telemetry. ?Repeat labs. ?Continue?empiric antibiotic therapy.  ?  I have personally reviewed aspects of patients?medical case and documents as made available?and as deemed appropriate?for management of patients care. All of the patients questions have been addressed and answered?to the best of my ability at this time. ?Plan has been discussed,?patient?and/or family?has voiced understanding. I will continue to monitor closely and make adjustments to medical management as needed.?  This note was created with the assistance of Dragon voice recognition software. ?There may be transcription errors?as a result of using this technology?however minimal. Effort has been made to assure accuracy of transcription but any obvious errors or omissions should be clarified with the author of the document.?   Problem List/Past Medical History  Atrial fibrillation  Coronary artery disease  Depression  Anxiety  Hypertension  Hyperlipidemia  Hypothyroidism  CVA  Aortic Valve Replacement  Procedure/Surgical History  Colonoscopy (08/19/2021)  Debridement (eg, high pressure waterjet with/without suction, sharp selective debridement with scissors, scalpel and forceps), open wound, (eg, fibrin, devitalized epidermis and/or dermis, exudate, debris, biofilm), including topical application(s), wound (01/20/2021)  Extraction of Chest Skin, External Approach  (01/20/2021)  Debridement (eg, high pressure waterjet with/without suction, sharp selective debridement with scissors, scalpel and forceps), open wound, (eg, fibrin, devitalized epidermis and/or dermis, exudate, debris, biofilm), including topical application(s), wound (01/13/2021)  Extraction of Chest Skin, External Approach (01/13/2021)  Debridement, subcutaneous tissue (includes epidermis and dermis, if performed); first 20 sq cm or less (04/08/2020)  Excision of Left Lower Leg Subcutaneous Tissue and Fascia, Open Approach (04/08/2020)  Debridement, subcutaneous tissue (includes epidermis and dermis, if performed); first 20 sq cm or less (04/01/2020)  Excision of Left Lower Leg Subcutaneous Tissue and Fascia, Open Approach (04/01/2020)  Debridement (eg, high pressure waterjet with/without suction, sharp selective debridement with scissors, scalpel and forceps), open wound, (eg, fibrin, devitalized epidermis and/or dermis, exudate, debris, biofilm), including topical application(s), wound (03/24/2020)  Extraction of Left Lower Leg Skin, External Approach (03/24/2020)  Drainage of Left Lower Leg Skin, External Approach (03/17/2020)  Incision and drainage of hematoma, seroma or fluid collection (03/17/2020)  Carotid endarterectomy (2015)  Colonoscopy (10.2014)  Bilateral replacement of knee joints (2011)  Arthroplasty of right shoulder (2003)  left shoulder surgery (2003)   Medications  Inpatient  acetaminophen, 650 mg= 20.3 mL, Oral, q6hr, PRN  acetaminophen, 1000 mg= 2 tab(s), Oral, q6hr, PRN  Sodium Chloride 0.9% intravenous solution 1,000 mL, 1000 mL, IV  Zofran, 4 mg= 2 mL, IV Push, q4hr, PRN  Zosyn 3.375 gm (for IVPB)  Home  ASPIRIN 81MG EC LOW DOSE D/F TABS, 81 mg= 1 tab(s), Oral, Daily  ASPIRIN 81MG EC LOW DOSE D/F TABS, See Instructions, 3 refills,? ?Not taking: duplicate  cefadroxil 500 mg oral capsule, 500 mg= 1 cap(s), Oral, q12hr,? ?Not taking  CLOPIDOGREL 75MG TABLETS, 75 mg= 1 tab(s), Oral,  Daily  CoQ10 300 mg oral capsule, 600 mg= 2 cap(s), Oral, Daily  dicyclomine 10 mg oral capsule, 10 mg= 1 cap(s), Oral, q12hr  ezetimibe 10 mg oral tablet, See Instructions, 11 refills  FUROSEMIDE 40MG TABLETS, 40 mg= 1 tab(s), Oral, Daily  gabapentin 100 mg oral capsule, 100 mg= 1 cap(s), Oral, BID, PRN,? ?Not taking  levothyroxine 50 mcg (0.05 mg) oral tablet, See Instructions  Lexapro 10 mg oral tablet, 10 mg= 1 tab(s), Oral, Daily, 1 refills  memantine 10 mg oral tablet, 10 mg= 1 tab(s), Oral, BID  METOPROLOL ER SUCCINATE 25MG TABS, 25 mg= 1 tab(s), Oral, Daily  RAPAFLO 8 MG CAPSULE, 8 mg= 1 cap(s), Oral, Daily  traZODONE 150 mg oral tab ( Desyrel ), See Instructions, 11 refills  traZODONE 50 mg oral tablet ( Desyrel ), 75 mg= 1.5 tab(s), Oral, qPM  Vitamin D3 400 intl units oral tablet, 400 IntUnit= 1 tab(s), Oral, Daily  Allergies  No Known Allergies  Social History  Alcohol  Current, Beer, 1-2 times per month, 08/03/2018  Substance Use  Never, 08/03/2018  Tobacco  Never (less than 100 in lifetime), No, 08/21/2021  Never (less than 100 in lifetime), N/A, 06/26/2019  Family History  CAD - Coronary artery disease: Mother and Father.  Immunizations  Vaccine Date Status   pneumococcal 13-valent conjugate vaccine 09/12/2018 Given   zoster vaccine, inactivated 09/11/2018 Recorded   tetanus-diphtheria toxoids 11/27/2017 Recorded   zoster vaccine live 11/12/2014 Recorded   Lab Results  Labs Last 24 Hours?  ?Chemistry? Hematology/Coagulation?   Sodium Lvl:?135 mmol/L?Low (08/21/21 12:45:00) PT:?15.1 second(s)?High (08/21/21 12:45:00)   Potassium Lvl: 4.6 mmol/L (08/21/21 12:45:00) INR: 1.2 (08/21/21 12:45:00)   Chloride: 98 mmol/L (08/21/21 12:45:00) PTT:?35.8 second(s)?High (08/21/21 12:45:00)   CO2: 25 mmol/L (08/21/21 12:45:00) WBC: 9.9 x10(3)/mcL (08/21/21 12:45:00)   Calcium Lvl: 9.7 mg/dL (08/21/21 12:45:00) RBC:?4.36 x10(6)/mcL?Low (08/21/21 12:45:00)   Glucose Lvl: 90 mg/dL (08/21/21 12:45:00) Hgb:?13.3  gm/dL?Low (08/21/21 12:45:00)   BUN: 18.3 mg/dL (08/21/21 12:45:00) Hct:?41.5 %?Low (08/21/21 12:45:00)   Creatinine:?1.27 mg/dL?High (08/21/21 12:45:00) Platelet: 213 x10(3)/mcL (08/21/21 12:45:00)   Est Creat Clearance Ser: 39.62 mL/min (08/21/21 13:58:43) MCV:?95.2 fL?High (08/21/21 12:45:00)   eGFR-AA: >60 (08/21/21 12:45:00) MCH: 30.5 pg (08/21/21 12:45:00)   eGFR-RABIA: 58 mL/min/1.73 m2 (08/21/21 12:45:00) MCHC:?32 gm/dL?Low (08/21/21 12:45:00)   Bili Total: 1.5 mg/dL (08/21/21 12:45:00) RDW: 14.8 % (08/21/21 12:45:00)   Bili Direct:?0.6 mg/dL?High (08/21/21 12:45:00) MPV: 11.2 fL (08/21/21 12:45:00)   Bili Indirect:?0.9 mg/dL?High (08/21/21 12:45:00) Abs Neut: 7.45 x10(3)/mcL (08/21/21 12:45:00)   AST: 31 unit/L (08/21/21 12:45:00) Neutro Auto: 75 % (08/21/21 12:45:00)   ALT: 24 unit/L (08/21/21 12:45:00) Lymph Auto: 17 % (08/21/21 12:45:00)   Alk Phos:?214 unit/L?High (08/21/21 12:45:00) Mono Auto: 6 % (08/21/21 12:45:00)   Total Protein: 7 gm/dL (08/21/21 12:45:00) Eos Auto: 1 % (08/21/21 12:45:00)   Albumin Lvl: 3.5 gm/dL (08/21/21 12:45:00) Abs Eos: 0.1 x10(3)/mcL (08/21/21 12:45:00)   Globulin: 3.5 gm/dL (08/21/21 12:45:00) Basophil Auto: 0 % (08/21/21 12:45:00)   A/G Ratio:?1 ratio?Low (08/21/21 12:45:00) Abs Neutro: 7.45 x10(3)/mcL (08/21/21 12:45:00)   Lactic Acid Lvl: 0.8 mmol/L (08/21/21 13:17:00) Abs Lymph: 1.7 x10(3)/mcL (08/21/21 12:45:00)   Lipase Lvl: 48 U/L (08/21/21 13:05:00) Abs Mono: 0.6 x10(3)/mcL (08/21/21 12:45:00)   Troponin-I: <0.010 (08/21/21 13:05:00) Abs Baso: 0 x10(3)/mcL (08/21/21 12:45:00)   Diagnostic Results  Accession:?[id number]  Order:?CT Abdomen and Pelvis W Contrast  Report Dt/Tm:?08/21/2021 14:40  Report:?  INDICATION: Other (please specify)  COMPARISON:  None.  ?  TECHNIQUE:?  CT of the abdomen and pelvis WITH intravenous contrast. The abdomen  and pelvis were scanned utilizing a multidetector helical scanner from  the diaphragm to the lesser trochanter after the  administration of  intravenous contrast. Coronal and sagittal reformations were obtained.  Automatic exposure control was utilized to limit radiation dose.  ?  Radiation Dose:  Total DLP: 647 mGy*cm  ?  DISCUSSION:  LOWER THORAX: Bibasilar atelectasis/scarring. Otherwise lung bases  appear. No pericardial or pleural effusions.  ?  HEPATOBILIARY: Liver size within normal limits. Heterogeneous liver.  Diffuse hepatic low attenuation suggests steatosis. No focal hepatic  lesions. No biliary ductal dilatation. Gallbladder within normal  limits. No radiodense gallstones.  SPLEEN: Spleen size within normal limits. No focal splenic lesions.  ?  PANCREAS: No focal masses or ductal dilatation.  ADRENALS: No adrenal nodules.  KIDNEYS/URETERS: No hydronephrosis, stones, or solid mass lesions.  Renal cysts, the largest is on the left measures 5 cm and of simple  fluid attenuation.  ?  PELVIC ORGANS/BLADDER: Heterogeneous prostate. Bladder wall  prominence. Possible fistulous tract from the underside of the sigmoid  colon to the posterior wall the bladder (series 2 image 68 and 69;  coronal images 58 and 59). Penile prosthesis in place.  PERITONEUM / RETROPERITONEUM: Small volume scattered pneumoperitoneum,  greatest in the right upper quadrant. No free fluid.  ?  LYMPH NODES: No lymphadenopathy.  VESSELS: Moderate atherosclerotic vascular calcifications of a  nonaneurysmal abdominal aorta and its iliac branches.  ?  GI TRACT: No distention or wall thickening. Distal colonic  diverticulosis. No diverticulitis. Normal appendix.  ?  BONES AND SOFT TISSUES: Small fat-containing left inguinal hernia.  Otherwise soft tissues within normal limits. No bony destructive  lesions. No acute bony pathology.  ?  IMPRESSION:?  ?  1. Pneumoperitoneum raises concern for perforated viscus.  ?  2. Bladder wall thickening may relate to underdistention and/or  infection. Colovesicular fistula is a possibility.  ?  3. Colonic diverticulosis. No  diverticulitis.?  ?  ?  Emergency Department called 8/21/2021 @ 1456 hours.  ?

## 2022-06-22 ENCOUNTER — OFFICE VISIT (OUTPATIENT)
Dept: URGENT CARE | Facility: CLINIC | Age: 82
End: 2022-06-22
Payer: MEDICARE

## 2022-06-22 VITALS
SYSTOLIC BLOOD PRESSURE: 137 MMHG | HEIGHT: 65 IN | WEIGHT: 164 LBS | DIASTOLIC BLOOD PRESSURE: 83 MMHG | HEART RATE: 87 BPM | OXYGEN SATURATION: 98 % | TEMPERATURE: 99 F | BODY MASS INDEX: 27.32 KG/M2 | RESPIRATION RATE: 17 BRPM

## 2022-06-22 DIAGNOSIS — U07.1 COVID-19 VIRUS DETECTED: ICD-10-CM

## 2022-06-22 DIAGNOSIS — R50.9 FEVER, UNSPECIFIED FEVER CAUSE: Primary | ICD-10-CM

## 2022-06-22 DIAGNOSIS — U07.1 COVID: ICD-10-CM

## 2022-06-22 LAB
CTP QC/QA: YES
CTP QC/QA: YES
POC MOLECULAR INFLUENZA A AGN: NEGATIVE
POC MOLECULAR INFLUENZA B AGN: NEGATIVE
SARS-COV-2 RDRP RESP QL NAA+PROBE: POSITIVE

## 2022-06-22 PROCEDURE — 87502 INFLUENZA DNA AMP PROBE: CPT | Mod: QW,,, | Performed by: FAMILY MEDICINE

## 2022-06-22 PROCEDURE — 99213 OFFICE O/P EST LOW 20 MIN: CPT | Mod: ,,, | Performed by: FAMILY MEDICINE

## 2022-06-22 PROCEDURE — U0002 COVID-19 LAB TEST NON-CDC: HCPCS | Mod: QW,,, | Performed by: FAMILY MEDICINE

## 2022-06-22 PROCEDURE — 87502 POCT INFLUENZA A/B MOLECULAR: ICD-10-PCS | Mod: QW,,, | Performed by: FAMILY MEDICINE

## 2022-06-22 PROCEDURE — 99213 PR OFFICE/OUTPT VISIT, EST, LEVL III, 20-29 MIN: ICD-10-PCS | Mod: ,,, | Performed by: FAMILY MEDICINE

## 2022-06-22 PROCEDURE — U0002: ICD-10-PCS | Mod: QW,,, | Performed by: FAMILY MEDICINE

## 2022-06-22 RX ORDER — LEVOTHYROXINE SODIUM 50 UG/1
50 TABLET ORAL DAILY
COMMUNITY
Start: 2022-05-28

## 2022-06-22 RX ORDER — METOPROLOL SUCCINATE 25 MG/1
25 TABLET, EXTENDED RELEASE ORAL DAILY
COMMUNITY

## 2022-06-22 RX ORDER — EZETIMIBE 10 MG/1
10 TABLET ORAL DAILY
COMMUNITY

## 2022-06-22 RX ORDER — FUROSEMIDE 40 MG/1
20 TABLET ORAL DAILY
COMMUNITY

## 2022-06-22 RX ORDER — EPINEPHRINE 0.22MG
100 AEROSOL WITH ADAPTER (ML) INHALATION 2 TIMES DAILY
COMMUNITY

## 2022-06-22 RX ORDER — TRAZODONE HYDROCHLORIDE 50 MG/1
75 TABLET ORAL
COMMUNITY

## 2022-06-22 RX ORDER — ESCITALOPRAM OXALATE 10 MG/1
10 TABLET ORAL DAILY
COMMUNITY

## 2022-06-22 NOTE — PATIENT INSTRUCTIONS
Medication sent to pharmacy    COVID Positive  Start vitamin C 1000mg twice a day, zinc 100mg once a day, and vitamin D3 at least 2000 units a day. Current CDC guidelines recommend that you quarantine for 5 days starting the day after your symptoms began. Quarantine can end after 5 days as long as the last 24 hours of quarantine you are fever free and there is improvement of all your symptoms. Wear a mask around others for 5 additional days after quarantine. Treat your symptoms as you would the common cold. If you live with anybody, isolate yourself in a separate bedroom and use a separate bathroom. If your symptoms worsen or you develop shortness of breath or a fever over 102.5 , seek medical attention immediately.

## 2022-06-22 NOTE — PROGRESS NOTES
"Subjective:       Patient ID: Jovany Reynolds is a 81 y.o. male.    Vitals:  height is 5' 5" (1.651 m) and weight is 74.4 kg (164 lb). His temperature is 98.6 °F (37 °C). His blood pressure is 137/83 and his pulse is 87. His respiration is 17 and oxygen saturation is 98%.     Chief Complaint: COVID-19 Concerns (Entered by patient), Sore Throat (3 days ), Nasal Congestion (3 days ), Fever (101.0  for 3 days ), Generalized Body Aches (3 days ), Cough (3 days ), Headache (3 days ), and Fatigue (3 days )    81 y.o. male present to clinic for fatigue, sore throat, congestion, cough, headache, fever 101.0 for 3 days.  Denies any shortness of breath or diarrhea.  Patient had COVID vaccine but not booster.    Sore Throat   This is a new problem. The current episode started in the past 7 days. The maximum temperature recorded prior to his arrival was 101 - 101.9 F. The pain is at a severity of 7/10. Associated symptoms include coughing and headaches. He has tried acetaminophen for the symptoms. The treatment provided mild relief.   Fever   This is a new problem. The current episode started in the past 7 days. The maximum temperature noted was 101 to 101.9 F. Associated symptoms include coughing, headaches and a sore throat.   Cough  This is a new problem. The current episode started in the past 7 days. The cough is productive of sputum. Associated symptoms include a fever, headaches and a sore throat.   Headache   This is a new problem. The current episode started yesterday. The pain is at a severity of 3/10. Associated symptoms include coughing, a fever and a sore throat.   Fatigue  This is a new problem. The current episode started in the past 7 days. The problem has been unchanged. Associated symptoms include coughing, fatigue, a fever, headaches and a sore throat.       Constitution: Positive for fatigue and fever.   HENT: Positive for sore throat.    Neck: neck negative.   Cardiovascular: Negative.    Eyes: " Negative.    Respiratory: Positive for cough.    Gastrointestinal: Negative.    Genitourinary: Negative.    Musculoskeletal: Negative.    Skin: Negative.    Allergic/Immunologic: Negative.    Neurological: Positive for headaches.   Hematologic/Lymphatic: Negative.        Objective:      Physical Exam   Constitutional: He is oriented to person, place, and time. He does not appear ill.   HENT:   Head: Normocephalic and atraumatic.   Ears:   Right Ear: External ear normal.   Left Ear: External ear normal.   Eyes: Conjunctivae are normal.   Pulmonary/Chest: Effort normal.   Abdominal: Normal appearance.   Neurological: He is alert and oriented to person, place, and time.   Psychiatric: His behavior is normal. Mood, judgment and thought content normal.   Vitals reviewed.         Previous History      Review of patient's allergies indicates:  No Known Allergies    Past Medical History:   Diagnosis Date    Stroke     Thyroid disease      Current Outpatient Medications   Medication Instructions    coenzyme Q10 100 mg, Oral    EScitalopram oxalate (LEXAPRO) 10 mg, Oral, Daily    ezetimibe (ZETIA) 10 mg, Oral, Daily    furosemide (LASIX) 20 mg, Oral    levothyroxine (SYNTHROID) 50 mcg, Oral, Daily    metoprolol succinate (TOPROL-XL) 25 mg, Oral, Daily    nirmatrelvir-ritonavir 150 mg x 2- 100 mg copackaged tablets (EUA) Take 3 tablets by mouth 2 (two) times daily. Each dose contains 2 nirmatrelvir (pink tablets) and 1 ritonavir (white tablet). Take all 3 tablets together    traZODone (DESYREL) 75 mg, Oral     Past Surgical History:   Procedure Laterality Date    CAROTID ENDARTERECTOMY      Quadruple bypass       History reviewed. No pertinent family history.    Social History     Tobacco Use    Smoking status: Never Smoker    Smokeless tobacco: Never Used   Substance Use Topics    Alcohol use: Not Currently    Drug use: Never        Physical Exam      Vital Signs Reviewed   /83   Pulse 87   Temp 98.6  "°F (37 °C)   Resp 17   Ht 5' 5" (1.651 m)   Wt 74.4 kg (164 lb)   SpO2 98%   BMI 27.29 kg/m²        Procedures    Procedures     Labs     Results for orders placed or performed in visit on 06/22/22   POCT COVID-19 Rapid Screening   Result Value Ref Range    POC Rapid COVID Positive (A) Negative     Acceptable Yes          Assessment:       1. Fever, unspecified fever cause    2. COVID          Plan:           Medication sent to pharmacy    COVID Positive  Start vitamin C 1000mg twice a day, zinc 100mg once a day, and vitamin D3 at least 2000 units a day. Current CDC guidelines recommend that you quarantine for 5 days starting the day after your symptoms began. Quarantine can end after 5 days as long as the last 24 hours of quarantine you are fever free and there is improvement of all your symptoms. Wear a mask around others for 5 additional days after quarantine. Treat your symptoms as you would the common cold. If you live with anybody, isolate yourself in a separate bedroom and use a separate bathroom. If your symptoms worsen or you develop shortness of breath or a fever over 102.5 , seek medical attention immediately.       Fever, unspecified fever cause  -     POCT COVID-19 Rapid Screening  -     POCT Influenza A/B MOLECULAR    COVID    Other orders  -     nirmatrelvir-ritonavir 150 mg x 2- 100 mg copackaged tablets (EUA); Take 3 tablets by mouth 2 (two) times daily. Each dose contains 2 nirmatrelvir (pink tablets) and 1 ritonavir (white tablet). Take all 3 tablets together  Dispense: 30 tablet; Refill: 0                     "

## 2022-06-24 ENCOUNTER — TELEPHONE (OUTPATIENT)
Dept: URGENT CARE | Facility: CLINIC | Age: 82
End: 2022-06-24

## 2023-03-30 ENCOUNTER — OFFICE VISIT (OUTPATIENT)
Dept: URGENT CARE | Facility: CLINIC | Age: 83
End: 2023-03-30
Payer: MEDICARE

## 2023-03-30 VITALS
HEART RATE: 68 BPM | TEMPERATURE: 98 F | WEIGHT: 165 LBS | RESPIRATION RATE: 17 BRPM | BODY MASS INDEX: 27.49 KG/M2 | OXYGEN SATURATION: 98 % | DIASTOLIC BLOOD PRESSURE: 68 MMHG | HEIGHT: 65 IN | SYSTOLIC BLOOD PRESSURE: 104 MMHG

## 2023-03-30 DIAGNOSIS — J32.9 SINUSITIS, UNSPECIFIED CHRONICITY, UNSPECIFIED LOCATION: Primary | ICD-10-CM

## 2023-03-30 PROCEDURE — 99213 OFFICE O/P EST LOW 20 MIN: CPT | Mod: 25,,, | Performed by: FAMILY MEDICINE

## 2023-03-30 PROCEDURE — 96372 THER/PROPH/DIAG INJ SC/IM: CPT | Mod: ,,, | Performed by: FAMILY MEDICINE

## 2023-03-30 PROCEDURE — 96372 PR INJECTION,THERAP/PROPH/DIAG2ST, IM OR SUBCUT: ICD-10-PCS | Mod: ,,, | Performed by: FAMILY MEDICINE

## 2023-03-30 PROCEDURE — 99213 PR OFFICE/OUTPT VISIT, EST, LEVL III, 20-29 MIN: ICD-10-PCS | Mod: 25,,, | Performed by: FAMILY MEDICINE

## 2023-03-30 RX ORDER — DEXAMETHASONE SODIUM PHOSPHATE 100 MG/10ML
6 INJECTION INTRAMUSCULAR; INTRAVENOUS
Status: COMPLETED | OUTPATIENT
Start: 2023-03-30 | End: 2023-03-30

## 2023-03-30 RX ORDER — PREDNISONE 20 MG/1
20 TABLET ORAL DAILY
Qty: 5 TABLET | Refills: 0 | Status: SHIPPED | OUTPATIENT
Start: 2023-03-30 | End: 2023-04-04

## 2023-03-30 RX ADMIN — DEXAMETHASONE SODIUM PHOSPHATE 6 MG: 100 INJECTION INTRAMUSCULAR; INTRAVENOUS at 02:03

## 2023-03-30 NOTE — PROGRESS NOTES
"Subjective:      Patient ID: Jovany Reynolds is a 82 y.o. male.    Vitals:  height is 5' 5" (1.651 m) and weight is 74.8 kg (165 lb). His temperature is 97.8 °F (36.6 °C). His blood pressure is 104/68 and his pulse is 68. His respiration is 17 and oxygen saturation is 98%.     Chief Complaint: Nasal Congestion ( Patient is a 82 y.o.  male who presents to urgent care with complaints of congestion,sinus pressure in head, face and right ear, dizzy  x 3  days. Alleviating factors include no  with no relief. Patient denies fever or sore throat. Decline testing.  )      82-year-old male presents to clinic complaining of a 2 day history of congestion sinus pressure right ear pressure and dizziness.  Patient states he is got fluid in the right ear that is causing his dizziness.  Denies any fever or sore throat.  States he has shortness of breath at his baseline but is not worse.  Denies any cough    Constitution: Negative.   HENT:  Positive for congestion and sinus pressure.    Neck: neck negative.   Cardiovascular: Negative.    Eyes: Negative.    Respiratory: Negative.     Gastrointestinal: Negative.    Genitourinary: Negative.    Musculoskeletal: Negative.    Skin: Negative.    Allergic/Immunologic: Negative.    Neurological: Negative.    Hematologic/Lymphatic: Negative.     Objective:     Physical Exam   Constitutional: He is oriented to person, place, and time. He appears well-developed. He is cooperative.  Non-toxic appearance. He does not appear ill. No distress.   HENT:   Head: Normocephalic and atraumatic.   Ears:   Right Ear: Hearing and external ear normal. impacted cerumen (fluid behind the right TM)  Left Ear: Hearing, tympanic membrane and external ear normal.   Mouth/Throat: Mucous membranes are normal. No posterior oropharyngeal erythema (postnasal drip).   Eyes: Conjunctivae and lids are normal.   Neck: Trachea normal and phonation normal. Neck supple. No edema present. No erythema present. No neck " rigidity present.   Cardiovascular: Normal rate, regular rhythm and normal heart sounds.   Pulmonary/Chest: Effort normal and breath sounds normal. No stridor. No respiratory distress. He has no decreased breath sounds. He has no wheezes. He has no rhonchi. He has no rales.   Abdominal: Normal appearance.   Neurological: no focal deficit. He is alert and oriented to person, place, and time. He exhibits normal muscle tone.   Skin: Skin is warm, intact and not diaphoretic.   Psychiatric: His speech is normal and behavior is normal. Mood, judgment and thought content normal.   Nursing note and vitals reviewed.       Previous History      Review of patient's allergies indicates:  No Known Allergies    Past Medical History:   Diagnosis Date    Anxiety disorder, unspecified     Chronic back pain     Stroke     Thyroid disease     Unspecified cataract     Unspecified dementia without behavioral disturbance     Unspecified sensorineural hearing loss      Current Outpatient Medications   Medication Instructions    coenzyme Q10 100 mg, Oral    EScitalopram oxalate (LEXAPRO) 10 mg, Oral, Daily    ezetimibe (ZETIA) 10 mg, Oral, Daily    furosemide (LASIX) 20 mg, Oral    levothyroxine (SYNTHROID) 50 mcg, Oral, Daily    metoprolol succinate (TOPROL-XL) 25 mg, Oral, Daily    nirmatrelvir-ritonavir 150 mg x 2- 100 mg copackaged tablets (EUA) Take 3 tablets by mouth 2 (two) times daily. Each dose contains 2 nirmatrelvir (pink tablets) and 1 ritonavir (white tablet). Take all 3 tablets together    predniSONE (DELTASONE) 20 mg, Oral, Daily    traZODone (DESYREL) 75 mg, Oral     Past Surgical History:   Procedure Laterality Date    CAROTID ENDARTERECTOMY      EXTRACTION OF CATARACT Left 10/11/2022    Dr. Wells    Quadruple bypass       History reviewed. No pertinent family history.    Social History     Tobacco Use    Smoking status: Never    Smokeless tobacco: Never   Substance Use Topics    Alcohol use: Not Currently    Drug use:  "Never        Physical Exam      Vital Signs Reviewed   /68   Pulse 68   Temp 97.8 °F (36.6 °C)   Resp 17   Ht 5' 5" (1.651 m)   Wt 74.8 kg (165 lb)   SpO2 98%   BMI 27.46 kg/m²        Procedures    Procedures     Labs     Results for orders placed or performed in visit on 06/22/22   POCT COVID-19 Rapid Screening   Result Value Ref Range    POC Rapid COVID Positive (A) Negative     Acceptable Yes    POCT Influenza A/B MOLECULAR   Result Value Ref Range    POC Molecular Influenza A Ag Negative Negative, Not Reported    POC Molecular Influenza B Ag Negative Negative, Not Reported     Acceptable Yes        Assessment:     1. Sinusitis, unspecified chronicity, unspecified location        Plan:     Medications sent to pharmacy  Start steroids tomorrow morning  Start taking an allergy pill daily such as claritin, zyrtec, allegrea or xyzal. Also start using a nasal steroid spray such as flonase or nasacort daily. If oral steroids were prescribed, start them tomorrow morning. Monitor for fever. Take tylenol/acetaminophen or ibuprofen as needed. Rest and hydrate. If symptoms persist or worsen, return to clinic or seek medical attention immediately.     Sinusitis, unspecified chronicity, unspecified location    Other orders  -     dexAMETHasone injection 6 mg  -     predniSONE (DELTASONE) 20 MG tablet; Take 1 tablet (20 mg total) by mouth once daily. for 5 days  Dispense: 5 tablet; Refill: 0                    "

## 2023-03-30 NOTE — PATIENT INSTRUCTIONS
Medications sent to pharmacy  Start steroids tomorrow morning  Start taking an allergy pill daily such as claritin, zyrtec, allegrea or xyzal. Also start using a nasal steroid spray such as flonase or nasacort daily. If oral steroids were prescribed, start them tomorrow morning. Monitor for fever. Take tylenol/acetaminophen or ibuprofen as needed. Rest and hydrate. If symptoms persist or worsen, return to clinic or seek medical attention immediately.

## 2023-05-01 DIAGNOSIS — R42 DIZZINESS AND GIDDINESS: Primary | ICD-10-CM

## 2023-07-10 ENCOUNTER — OFFICE VISIT (OUTPATIENT)
Dept: NEUROLOGY | Facility: CLINIC | Age: 83
End: 2023-07-10
Payer: MEDICARE

## 2023-07-10 VITALS — WEIGHT: 154 LBS | BODY MASS INDEX: 25.66 KG/M2 | HEIGHT: 65 IN

## 2023-07-10 DIAGNOSIS — G31.84 MILD COGNITIVE IMPAIRMENT: ICD-10-CM

## 2023-07-10 DIAGNOSIS — R41.3 OTHER AMNESIA: Primary | ICD-10-CM

## 2023-07-10 DIAGNOSIS — R42 DIZZINESS AND GIDDINESS: ICD-10-CM

## 2023-07-10 PROCEDURE — 99417 PROLNG OP E/M EACH 15 MIN: CPT | Mod: S$GLB,,, | Performed by: PSYCHIATRY & NEUROLOGY

## 2023-07-10 PROCEDURE — 99205 OFFICE O/P NEW HI 60 MIN: CPT | Mod: S$GLB,,, | Performed by: PSYCHIATRY & NEUROLOGY

## 2023-07-10 PROCEDURE — 1159F MED LIST DOCD IN RCRD: CPT | Mod: CPTII,S$GLB,, | Performed by: PSYCHIATRY & NEUROLOGY

## 2023-07-10 PROCEDURE — 99205 PR OFFICE/OUTPT VISIT, NEW, LEVL V, 60-74 MIN: ICD-10-PCS | Mod: S$GLB,,, | Performed by: PSYCHIATRY & NEUROLOGY

## 2023-07-10 PROCEDURE — 99999 PR PBB SHADOW E&M-EST. PATIENT-LVL IV: CPT | Mod: PBBFAC,,, | Performed by: PSYCHIATRY & NEUROLOGY

## 2023-07-10 PROCEDURE — 1160F PR REVIEW ALL MEDS BY PRESCRIBER/CLIN PHARMACIST DOCUMENTED: ICD-10-PCS | Mod: CPTII,S$GLB,, | Performed by: PSYCHIATRY & NEUROLOGY

## 2023-07-10 PROCEDURE — 3288F FALL RISK ASSESSMENT DOCD: CPT | Mod: CPTII,S$GLB,, | Performed by: PSYCHIATRY & NEUROLOGY

## 2023-07-10 PROCEDURE — 99999 PR PBB SHADOW E&M-EST. PATIENT-LVL IV: ICD-10-PCS | Mod: PBBFAC,,, | Performed by: PSYCHIATRY & NEUROLOGY

## 2023-07-10 PROCEDURE — 1126F AMNT PAIN NOTED NONE PRSNT: CPT | Mod: CPTII,S$GLB,, | Performed by: PSYCHIATRY & NEUROLOGY

## 2023-07-10 PROCEDURE — 3288F PR FALLS RISK ASSESSMENT DOCUMENTED: ICD-10-PCS | Mod: CPTII,S$GLB,, | Performed by: PSYCHIATRY & NEUROLOGY

## 2023-07-10 PROCEDURE — 1160F RVW MEDS BY RX/DR IN RCRD: CPT | Mod: CPTII,S$GLB,, | Performed by: PSYCHIATRY & NEUROLOGY

## 2023-07-10 PROCEDURE — 99417 PR PROLONGED SVC, OUTPT, W/WO DIRECT PT CONTACT,  EA ADDTL 15 MIN: ICD-10-PCS | Mod: S$GLB,,, | Performed by: PSYCHIATRY & NEUROLOGY

## 2023-07-10 PROCEDURE — 1159F PR MEDICATION LIST DOCUMENTED IN MEDICAL RECORD: ICD-10-PCS | Mod: CPTII,S$GLB,, | Performed by: PSYCHIATRY & NEUROLOGY

## 2023-07-10 PROCEDURE — 1101F PR PT FALLS ASSESS DOC 0-1 FALLS W/OUT INJ PAST YR: ICD-10-PCS | Mod: CPTII,S$GLB,, | Performed by: PSYCHIATRY & NEUROLOGY

## 2023-07-10 PROCEDURE — 1101F PT FALLS ASSESS-DOCD LE1/YR: CPT | Mod: CPTII,S$GLB,, | Performed by: PSYCHIATRY & NEUROLOGY

## 2023-07-10 PROCEDURE — 1126F PR PAIN SEVERITY QUANTIFIED, NO PAIN PRESENT: ICD-10-PCS | Mod: CPTII,S$GLB,, | Performed by: PSYCHIATRY & NEUROLOGY

## 2023-07-10 RX ORDER — SERTRALINE HYDROCHLORIDE 100 MG/1
100 TABLET, FILM COATED ORAL DAILY
COMMUNITY
Start: 2023-04-03

## 2023-07-10 RX ORDER — PSEUDOEPHEDRINE HCL 120 MG
1 TABLET, EXTENDED RELEASE ORAL DAILY
COMMUNITY

## 2023-07-10 RX ORDER — BUTYROSPERMUM PARKII(SHEA BUTTER), SIMMONDSIA CHINENSIS (JOJOBA) SEED OIL, ALOE BARBADENSIS LEAF EXTRACT .01; 1; 3.5 G/100G; G/100G; G/100G
250 LIQUID TOPICAL DAILY
COMMUNITY

## 2023-07-10 RX ORDER — IBUPROFEN 100 MG/5ML
2000 SUSPENSION, ORAL (FINAL DOSE FORM) ORAL DAILY
COMMUNITY

## 2023-07-10 RX ORDER — NITROGLYCERIN 0.4 MG/1
1 TABLET SUBLINGUAL
COMMUNITY
Start: 2023-03-13

## 2023-07-10 RX ORDER — LEVOMEFOLATE/ALGAL OIL 7.5-90.314
600 CAPSULE ORAL DAILY
COMMUNITY

## 2023-07-10 RX ORDER — DAPAGLIFLOZIN 10 MG/1
10 TABLET, FILM COATED ORAL DAILY
COMMUNITY
Start: 2023-07-06

## 2023-07-10 RX ORDER — FINASTERIDE 5 MG/1
5 TABLET, FILM COATED ORAL DAILY
COMMUNITY
Start: 2023-04-22

## 2023-07-10 RX ORDER — PERPHENAZINE 2 MG
1 TABLET ORAL DAILY
COMMUNITY

## 2023-07-10 RX ORDER — LUTEIN 6 MG
1 TABLET ORAL DAILY
COMMUNITY

## 2023-07-10 RX ORDER — PNV NO.95/FERROUS FUM/FOLIC AC 28MG-0.8MG
50 TABLET ORAL DAILY
COMMUNITY

## 2023-07-10 RX ORDER — TAMSULOSIN HYDROCHLORIDE 0.4 MG/1
0.8 CAPSULE ORAL DAILY
COMMUNITY
Start: 2022-11-18

## 2023-07-10 RX ORDER — CHOLECALCIFEROL (VITAMIN D3) 50 MCG
2000 TABLET ORAL DAILY
COMMUNITY

## 2023-07-10 NOTE — PROGRESS NOTES
Chief Complaint   Patient presents with    Dizziness     NP: Referred by Carlos MENDIOLA for Neuro consult to evaluate for dizziness: When he started on aricept and memantine dizziness increase. Since he stop taking these medications dizziness has stop. He is still having problems with confusion that started a while back. His confusion is getting a little worse. He gets confused on with locations. While driving one day he did not know where he was. Still drives, lives with spouse. Averages 8 hours of sleep at night. It takes him a while to fall asleep.         This is a 82 y.o. male  here for memory loss.  Patient reports that over the past few years he has had episodic confusion.  Often occurs while driving.  He will forget where he is going.  For example last week patient was driving back from a movie and totally forgot what he was doing for a few minutes.  By the time they got back to his house he was better.  He was told that these episodes are TIAs.  He did have a stroke in 2011 however these symptoms started several years later.  Stroke affected his peripheral vision and symptoms improved.  He takes aspirin daily.    He was put on Aricept and Namenda but became very dizzy.  Once he stopped these medications dizziness improved.  He denies dizziness today.    Notably he does have difficulty sleeping he takes 2 Tylenol p.m. at night.        Medication List with Changes/Refills   Current Medications    ASCORBIC ACID, VITAMIN C, (VITAMIN C) 1000 MG TABLET    Take 2,000 mg by mouth Daily.    CHOLECALCIFEROL, VITAMIN D3, (VITAMIN D3) 50 MCG (2,000 UNIT) TAB    Take 2,000 Units by mouth Daily.    COENZYME Q10 100 MG CAPSULE    Take 100 mg by mouth 2 (two) times daily.    CYANOCOBALAMIN (VITAMIN B-12) 100 MCG TABLET    Take 50 mcg by mouth Daily.    DIPHENHYDRAMINE-ACETAMINOPHEN (TYLENOL PM)  MG TAB    Take 1 tablet by mouth every evening.    ESCITALOPRAM OXALATE (LEXAPRO) 10 MG TABLET    Take 10 mg by mouth  once daily.    EZETIMIBE (ZETIA) 10 MG TABLET    Take 10 mg by mouth once daily.    FARXIGA 10 MG TABLET    Take 10 mg by mouth Daily.    FINASTERIDE (PROSCAR) 5 MG TABLET    Take 5 mg by mouth Daily.    FISH,BORA,FLAX OILS-OM3,6,9NO1 (OMEGA 3-6-9) 1,200 MG CAP    Take 1 capsule by mouth Daily.    FUROSEMIDE (LASIX) 40 MG TABLET    Take 20 mg by mouth once daily.    LEVOMEFOLATE-ALGAL OIL (L-METHYLFOLATE FORTE) 7.5-90.314 MG CAP    Take 600 mg by mouth Daily.    LEVOTHYROXINE (SYNTHROID) 50 MCG TABLET    Take 50 mcg by mouth once daily.    MAGNESIUM OXIDE,ASPARTATE,CITR (TRIPLE MAGNESIUM COMPLEX) 400 MG MAGNESIUM CAP    Take 1 tablet by mouth Daily.    METOPROLOL SUCCINATE (TOPROL-XL) 25 MG 24 HR TABLET    Take 25 mg by mouth once daily.    NITROGLYCERIN (NITROSTAT) 0.4 MG SL TABLET    Place 1 tablet under the tongue as needed.    SACCHAROMYCES BOULARDII (FLORASTOR) 250 MG CAPSULE    Take 250 mg by mouth Daily.    SERTRALINE (ZOLOFT) 100 MG TABLET    Take 100 mg by mouth Daily.    TAMSULOSIN (FLOMAX) 0.4 MG CAP    Take 0.8 mg by mouth Daily.    TRAZODONE (DESYREL) 50 MG TABLET    Take 75 mg by mouth.    VITAMIN E ACID SUCCINATE (VITAMIN E SUCCINATE) 134 MG (200 UNIT) TAB    Take 1 tablet by mouth Daily.   Discontinued Medications    NIRMATRELVIR-RITONAVIR 150 MG X 2- 100 MG COPACKAGED TABLETS (EUA)    Take 3 tablets by mouth 2 (two) times daily. Each dose contains 2 nirmatrelvir (pink tablets) and 1 ritonavir (white tablet). Take all 3 tablets together        Past Surgical History:   Procedure Laterality Date    CAROTID ENDARTERECTOMY      EXTRACTION OF CATARACT Left 10/11/2022    Dr. Wells    FLEXIBLE LARYNGOSCOPY WITH DRUG-INDUCED SEDATION FOR EVALUATION OF SLEEP APNEA      FLEXIBLE LARYNGOSCOPY WITH DRUG-INDUCED SEDATION FOR EVALUATION OF SLEEP APNEA      Quadruple bypass      ROTATOR CUFF REPAIR Bilateral     TONSILLECTOMY AND ADENOIDECTOMY      TOTAL KNEE REPLACEMENT USING COMPUTER NAVIGATION Bilateral          Past Medical History:   Diagnosis Date    A-fib     Anxiety disorder, unspecified     CAD (coronary artery disease)     Chronic back pain     Chronic kidney disease, unspecified     Depression     Dizziness     Hard of hearing     Sleep apnea, unspecified     Stroke     Thyroid disease     Unspecified cataract     Unspecified dementia without behavioral disturbance     Unspecified sensorineural hearing loss     Urinary retention         Family History   Problem Relation Age of Onset    Heart disease Mother     Heart disease Father     Heart disease Sister     Stroke Sister         Social History     Socioeconomic History    Marital status:    Tobacco Use    Smoking status: Never    Smokeless tobacco: Never   Substance and Sexual Activity    Alcohol use: Not Currently    Drug use: Never          Review of Systems  Review of Systems   Constitutional: Negative for appetite change.   HENT: Negative for sinus pressure and sore throat.    Eyes: Negative for visual disturbance.   Respiratory: Negative for cough and shortness of breath.    Cardiovascular: Negative for chest pain.   Gastrointestinal: Negative for diarrhea and nausea.   Endocrine: Negative for cold intolerance and heat intolerance.   Genitourinary: Negative for dysuria.   Musculoskeletal: Negative for arthralgias and myalgias.   Skin: Negative for rash.   Allergic/Immunologic: Negative for immunocompromised state.   Neurological:        See HPI   Hematological: Does not bruise/bleed easily.   Psychiatric/Behavioral: Negative for hallucinations.      General: alert and oriented, no acute distress, no audible wheezes, pulse intact, no edema    There were no vitals filed for this visit.     Cognition and Comprehension  Speech and language intact  Follows commands  Speech fluent  Attention intact  Memory for recent events intact from history taking  Affect pleasant  Fund of knowledge adequate    MOCA 25/30 missed 2/5 on recall    Cranial nerves  II.  Optic: Visual fields full to confrontation both eyes  III, IV, VI. Oculomotor: Intact, Pupils equal, round and reactive to light, no nystagmus  V. Trigeminal: sensation to light touch normal  VII. No facial asymmetry or facial weakness  VIII. Hearing intact to spoken voice  IX/X. Glossopharyngeal/Vagus: Voice normal, palate rises symmetrically  XI. Axillary: Shoulder shrug normal  XII. Hypoglossal: Intact    Muscle Strength and Tone  Normal upper extremity tone  Normal lower extremity tone  Normal upper extremity strength  Normal lower extremity strength    Sensation  Intact to light touch and temperature    Reflexes  Normal and symmetric    Coordination and Gait  Finger to nose normal  Gait normal       Jovany was seen today for dizziness.    Diagnoses and all orders for this visit:    Other amnesia  -     MRI Brain W WO Contrast; Future  -     MRI Brain W WO Contrast    Dizziness and giddiness  -     Ambulatory referral/consult to Neurology    Mild cognitive impairment       Discussed diagnosis of MCI based on memory testing and history given.  I do not suspect that episodic confusional episodes are all TIAs.  There are no physical symptoms associated with this.  Likely related to emerging neurodegenerative process like dementia.  Plan MRI.  Patient is very hard of hearing so discussed the importance of getting hearing aids.  Also would advise him to stop using Tylenol PM at night and instead switch back to trazodone which she was on prior.

## 2023-07-10 NOTE — PROGRESS NOTES
[facility] 7/10/2023   Alternating [place] Making 1 - correct   Cube 1 - correct   Clock Contour 1 - correct   Clock Numbers 1 - correct   Clock Hands 1 - correct   Lion 1 - correct   Rhino 1 - correct   Camel 1 - correct   Face Yes   Velvet Yes   Buddhist Yes   Lizzy No   Red Yes   Face No   Velvet No   Buddhist Yes   Lizzy Yes   Red Yes   Forward Order - 2 1 8 5 4 1 - correct   Attention - Backward Order 1 - correct   Letters 1 - correct   Numbers 3 - 4 or 5 correct   Repeat - Jovany 1 - correct   Repeat - Cat 0 - incorrect   Fluency  0 - incorrect   Train - Bicycle 1 - correct   Watch-Ruler 1 - correct   Face 1 - correct   Velvet 1 - correct   Buddhist 0 - incorrect   Lizzy 0 - incorrect   Red 1 - correct   Date 0 - incorrect   Month 1 - correct   Year 1 - correct   Day 1 - correct   Place 1 - correct   City 1 - correct   Add 1 point if less than or equal to 12 yr edu 0 - incorrect   Total Score 25

## 2023-07-18 DIAGNOSIS — R41.3 OTHER AMNESIA: Primary | ICD-10-CM

## 2023-07-18 NOTE — PROGRESS NOTES
MRI shows old stroke. It also shows what looks like a small abnormally developed blood vessel. Radiologist recommended repeating the MRI in 3 months to ensure that is what this is. Will put in order for this

## 2023-07-19 ENCOUNTER — TELEPHONE (OUTPATIENT)
Dept: NEUROLOGY | Facility: CLINIC | Age: 83
End: 2023-07-19

## 2023-07-19 NOTE — TELEPHONE ENCOUNTER
----- Message from Joan Dao MD sent at 7/18/2023  3:56 PM CDT -----  MRI shows old stroke. It also shows what looks like a small abnormally developed blood vessel. Radiologist recommended repeating the MRI in 3 months to ensure that is what this is. Will put in order for this

## 2023-07-19 NOTE — TELEPHONE ENCOUNTER
Pt's wife informed MRI showed old stroke it also showed what looks like a small abnormally developed blood vessel the radiologist recommended repeating the MRI in 3 months to ensure that is what it is informed of future order    Wife stated Pt has a upcoming surgery in a month for his colon is wanting to to make sure it shouldn't cause any complications also requesting is this could cause a stroke

## 2023-07-20 NOTE — TELEPHONE ENCOUNTER
No it would not cause a stroke if it is a cavernous angioma. We will check his MRI in another 3 months to ensure it is stable

## 2023-07-21 NOTE — TELEPHONE ENCOUNTER
Pt's wife informed it would not cause a stroke if it is a cavernous angioma. We will check his MRI in another 3 months to ensure it is stable

## 2023-09-29 ENCOUNTER — OFFICE VISIT (OUTPATIENT)
Dept: URGENT CARE | Facility: CLINIC | Age: 83
End: 2023-09-29
Payer: MEDICARE

## 2023-09-29 VITALS
DIASTOLIC BLOOD PRESSURE: 77 MMHG | HEART RATE: 73 BPM | RESPIRATION RATE: 18 BRPM | TEMPERATURE: 98 F | HEIGHT: 65 IN | OXYGEN SATURATION: 98 % | BODY MASS INDEX: 24.32 KG/M2 | WEIGHT: 146 LBS | SYSTOLIC BLOOD PRESSURE: 119 MMHG

## 2023-09-29 DIAGNOSIS — J01.90 ACUTE SINUSITIS, RECURRENCE NOT SPECIFIED, UNSPECIFIED LOCATION: Primary | ICD-10-CM

## 2023-09-29 PROCEDURE — 99213 PR OFFICE/OUTPT VISIT, EST, LEVL III, 20-29 MIN: ICD-10-PCS | Mod: 25,,, | Performed by: PHYSICIAN ASSISTANT

## 2023-09-29 PROCEDURE — 96372 THER/PROPH/DIAG INJ SC/IM: CPT | Mod: ,,, | Performed by: PHYSICIAN ASSISTANT

## 2023-09-29 PROCEDURE — 99213 OFFICE O/P EST LOW 20 MIN: CPT | Mod: 25,,, | Performed by: PHYSICIAN ASSISTANT

## 2023-09-29 PROCEDURE — 96372 PR INJECTION,THERAP/PROPH/DIAG2ST, IM OR SUBCUT: ICD-10-PCS | Mod: ,,, | Performed by: PHYSICIAN ASSISTANT

## 2023-09-29 RX ORDER — BETAMETHASONE SODIUM PHOSPHATE AND BETAMETHASONE ACETATE 3; 3 MG/ML; MG/ML
6 INJECTION, SUSPENSION INTRA-ARTICULAR; INTRALESIONAL; INTRAMUSCULAR; SOFT TISSUE
Status: COMPLETED | OUTPATIENT
Start: 2023-09-29 | End: 2023-09-29

## 2023-09-29 RX ORDER — AMOXICILLIN AND CLAVULANATE POTASSIUM 875; 125 MG/1; MG/1
1 TABLET, FILM COATED ORAL EVERY 12 HOURS
Qty: 20 TABLET | Refills: 0 | Status: SHIPPED | OUTPATIENT
Start: 2023-09-29 | End: 2023-10-09

## 2023-09-29 RX ADMIN — BETAMETHASONE SODIUM PHOSPHATE AND BETAMETHASONE ACETATE 6 MG: 3; 3 INJECTION, SUSPENSION INTRA-ARTICULAR; INTRALESIONAL; INTRAMUSCULAR; SOFT TISSUE at 10:09

## 2023-09-29 NOTE — PROGRESS NOTES
"Subjective:      Patient ID: Jovany Reynolds is a 82 y.o. male.    Vitals:  height is 5' 5" (1.651 m) and weight is 66.2 kg (146 lb). His oral temperature is 98.3 °F (36.8 °C). His blood pressure is 119/77 and his pulse is 73. His respiration is 18 and oxygen saturation is 98%.     Chief Complaint: Sinus Problem     Patient is a 82 y.o. male who presents to urgent care with complaints of post nasal drip, scratchy throat x2-3 days. Alleviating factors include mucinex, zyrtec with mild amount of relief. Patient denies ear pain, fever, congestion, HA, cough.      ROS   Objective:     Physical Exam   Constitutional: He is oriented to person, place, and time. He appears well-developed. He is cooperative.  Non-toxic appearance. He does not appear ill. No distress.   HENT:   Head: Normocephalic and atraumatic.   Ears:   Right Ear: Hearing, tympanic membrane, external ear and ear canal normal.   Left Ear: Hearing, tympanic membrane, external ear and ear canal normal.   Nose: Nose normal. No nasal deformity. No epistaxis.   Mouth/Throat: Uvula is midline, oropharynx is clear and moist and mucous membranes are normal. No trismus in the jaw. Normal dentition. No uvula swelling. No oropharyngeal exudate, posterior oropharyngeal edema or posterior oropharyngeal erythema.   Eyes: Conjunctivae and lids are normal. No scleral icterus.   Neck: Trachea normal and phonation normal. Neck supple. No edema present. No erythema present. No neck rigidity present.   Cardiovascular: Normal rate, regular rhythm, normal heart sounds and normal pulses.   Pulmonary/Chest: Effort normal and breath sounds normal. No respiratory distress. He has no decreased breath sounds. He has no rhonchi.   Abdominal: Normal appearance.   Musculoskeletal: Normal range of motion.         General: No deformity. Normal range of motion.   Neurological: He is alert and oriented to person, place, and time. He exhibits normal muscle tone. Coordination normal.   Skin: " Skin is warm, dry, intact, not diaphoretic and not pale.   Psychiatric: His speech is normal and behavior is normal. Judgment and thought content normal.   Nursing note and vitals reviewed.         Previous History      Review of patient's allergies indicates:  No Known Allergies    Past Medical History:   Diagnosis Date    A-fib     Anxiety disorder, unspecified     CAD (coronary artery disease)     Chronic back pain     Chronic kidney disease, unspecified     Depression     Dizziness     Hard of hearing     Sleep apnea, unspecified     Stroke     Thyroid disease     Unspecified cataract     Unspecified dementia without behavioral disturbance     Unspecified sensorineural hearing loss     Urinary retention      Current Outpatient Medications   Medication Instructions    amoxicillin-clavulanate 875-125mg (AUGMENTIN) 875-125 mg per tablet 1 tablet, Oral, Every 12 hours    ascorbic acid (vitamin C) (VITAMIN C) 2,000 mg, Oral, Daily    cholecalciferol (vitamin D3) (VITAMIN D3) 2,000 Units, Oral, Daily    coenzyme Q10 100 mg, Oral, 2 times daily    cyanocobalamin (VITAMIN B-12) 50 mcg, Oral, Daily    diphenhydrAMINE-acetaminophen (TYLENOL PM)  mg Tab 1 tablet, Oral, Nightly    EScitalopram oxalate (LEXAPRO) 10 mg, Oral, Daily    ezetimibe (ZETIA) 10 mg, Oral, Daily    FARXIGA 10 mg, Oral, Daily    finasteride (PROSCAR) 5 mg, Oral, Daily    fish,bora,flax oils-om3,6,9no1 (OMEGA 3-6-9) 1,200 mg Cap 1 capsule, Oral, Daily    furosemide (LASIX) 20 mg, Oral, Daily    levomefolate-algal oil (L-METHYLFOLATE FORTE) 7.5-90.314 mg Cap 600 mg, Oral, Daily    levothyroxine (SYNTHROID) 50 mcg, Oral, Daily    magnesium oxide,aspartate,citr (TRIPLE MAGNESIUM COMPLEX) 400 mg magnesium Cap 1 tablet, Oral, Daily    metoprolol succinate (TOPROL-XL) 25 mg, Oral, Daily    nitroGLYCERIN (NITROSTAT) 0.4 MG SL tablet 1 tablet, Sublingual, As needed (PRN)    Saccharomyces boulardii (FLORASTOR) 250 mg, Oral, Daily    sertraline (ZOLOFT)  "100 mg, Oral, Daily    tamsulosin (FLOMAX) 0.8 mg, Oral, Daily    traZODone (DESYREL) 75 mg, Oral    vitamin E acid succinate (VITAMIN E SUCCINATE) 134 mg (200 unit) Tab 1 tablet, Oral, Daily     Past Surgical History:   Procedure Laterality Date    CAROTID ENDARTERECTOMY      COLON RESECTION      EXTRACTION OF CATARACT Left 10/11/2022    Dr. Wells    FLEXIBLE LARYNGOSCOPY WITH DRUG-INDUCED SEDATION FOR EVALUATION OF SLEEP APNEA      FLEXIBLE LARYNGOSCOPY WITH DRUG-INDUCED SEDATION FOR EVALUATION OF SLEEP APNEA      Quadruple bypass      ROTATOR CUFF REPAIR Bilateral     TONSILLECTOMY AND ADENOIDECTOMY      TOTAL KNEE REPLACEMENT USING COMPUTER NAVIGATION Bilateral      Family History   Problem Relation Age of Onset    Heart disease Mother     Heart disease Father     Heart disease Sister     Stroke Sister        Social History     Tobacco Use    Smoking status: Never    Smokeless tobacco: Never   Substance Use Topics    Alcohol use: Not Currently    Drug use: Never        Physical Exam      Vital Signs Reviewed   /77   Pulse 73   Temp 98.3 °F (36.8 °C) (Oral)   Resp 18   Ht 5' 5" (1.651 m)   Wt 66.2 kg (146 lb)   SpO2 98%   BMI 24.30 kg/m²        Procedures    Procedures     Labs     Results for orders placed or performed in visit on 06/22/22   POCT COVID-19 Rapid Screening   Result Value Ref Range    POC Rapid COVID Positive (A) Negative     Acceptable Yes    POCT Influenza A/B MOLECULAR   Result Value Ref Range    POC Molecular Influenza A Ag Negative Negative, Not Reported    POC Molecular Influenza B Ag Negative Negative, Not Reported     Acceptable Yes      Assessment:     1. Acute sinusitis, recurrence not specified, unspecified location        Plan:       Acute sinusitis, recurrence not specified, unspecified location    Other orders  -     betamethasone acetate-betamethasone sodium phosphate injection 6 mg  -     amoxicillin-clavulanate 875-125mg (AUGMENTIN) 875-125 " mg per tablet; Take 1 tablet by mouth every 12 (twelve) hours. for 10 days  Dispense: 20 tablet; Refill: 0    Drink plenty of fluids.     Get plenty of rest.     Go to the ER with any significant change or worsening of symptoms.     Follow up with your primary care doctor.                    RR then home

## 2023-09-29 NOTE — PATIENT INSTRUCTIONS
Drink plenty of fluids.     Get plenty of rest.     Go to the ER with any significant change or worsening of symptoms.     Follow up with your primary care doctor.

## 2023-11-26 ENCOUNTER — OFFICE VISIT (OUTPATIENT)
Dept: URGENT CARE | Facility: CLINIC | Age: 83
End: 2023-11-26
Payer: MEDICARE

## 2023-11-26 VITALS
HEIGHT: 65 IN | OXYGEN SATURATION: 100 % | TEMPERATURE: 98 F | WEIGHT: 157 LBS | SYSTOLIC BLOOD PRESSURE: 136 MMHG | HEART RATE: 76 BPM | BODY MASS INDEX: 26.16 KG/M2 | DIASTOLIC BLOOD PRESSURE: 86 MMHG | RESPIRATION RATE: 18 BRPM

## 2023-11-26 DIAGNOSIS — R09.81 NASAL CONGESTION: Primary | ICD-10-CM

## 2023-11-26 LAB
CTP QC/QA: YES
SARS-COV-2 RDRP RESP QL NAA+PROBE: NEGATIVE

## 2023-11-26 PROCEDURE — 87635 SARS-COV-2 COVID-19 AMP PRB: CPT | Mod: QW,,, | Performed by: PHYSICIAN ASSISTANT

## 2023-11-26 PROCEDURE — 99213 OFFICE O/P EST LOW 20 MIN: CPT | Mod: ,,, | Performed by: PHYSICIAN ASSISTANT

## 2023-11-26 PROCEDURE — 99213 PR OFFICE/OUTPT VISIT, EST, LEVL III, 20-29 MIN: ICD-10-PCS | Mod: ,,, | Performed by: PHYSICIAN ASSISTANT

## 2023-11-26 PROCEDURE — 87635: ICD-10-PCS | Mod: QW,,, | Performed by: PHYSICIAN ASSISTANT

## 2023-11-26 NOTE — PROGRESS NOTES
"Subjective:      Patient ID: Jovany Reynolds is a 83 y.o. male.    Vitals:  height is 5' 5" (1.651 m) and weight is 71.2 kg (157 lb). His oral temperature is 97.5 °F (36.4 °C). His blood pressure is 136/86 and his pulse is 76. His respiration is 18 and oxygen saturation is 100%.     Chief Complaint: Other Misc    Patient is an 83-year-old male complains of a 1 day history of generalized fatigue malaise and nasal congestion.  He does report COVID exposure from his wife.  Denies any fever cough or shortness of breath.  He did perform an at home COVID test which turned positive.        ROS   Objective:     Physical Exam   Constitutional: He is oriented to person, place, and time. He appears well-developed. He is cooperative.  Non-toxic appearance. He does not appear ill. No distress.   HENT:   Head: Normocephalic and atraumatic.   Ears:   Right Ear: Hearing and external ear normal.   Left Ear: Hearing and external ear normal.   Nose: Nose normal. No nasal deformity. No epistaxis.   Mouth/Throat: Uvula is midline, oropharynx is clear and moist and mucous membranes are normal. No trismus in the jaw. Normal dentition. No uvula swelling. No oropharyngeal exudate, posterior oropharyngeal edema or posterior oropharyngeal erythema.   Eyes: Conjunctivae and lids are normal. No scleral icterus.   Neck: Trachea normal and phonation normal. Neck supple. No edema present. No erythema present. No neck rigidity present.   Cardiovascular: Normal rate, regular rhythm, normal heart sounds and normal pulses.   Pulmonary/Chest: Effort normal and breath sounds normal. No respiratory distress. He has no decreased breath sounds. He has no rhonchi.   Abdominal: Normal appearance.   Musculoskeletal: Normal range of motion.         General: No deformity. Normal range of motion.   Neurological: He is alert and oriented to person, place, and time. He exhibits normal muscle tone. Coordination normal.   Skin: Skin is warm, dry, intact, not " diaphoretic and not pale.   Psychiatric: His speech is normal and behavior is normal. Judgment and thought content normal.   Nursing note and vitals reviewed.       Previous History      Review of patient's allergies indicates:  No Known Allergies    Past Medical History:   Diagnosis Date    A-fib     Anxiety disorder, unspecified     CAD (coronary artery disease)     Chronic back pain     Chronic kidney disease, unspecified     Depression     Dizziness     Hard of hearing     Sleep apnea, unspecified     Stroke     Thyroid disease     Unspecified cataract     Unspecified dementia without behavioral disturbance     Unspecified sensorineural hearing loss     Urinary retention      Current Outpatient Medications   Medication Instructions    ascorbic acid (vitamin C) (VITAMIN C) 2,000 mg, Oral, Daily    cholecalciferol (vitamin D3) (VITAMIN D3) 2,000 Units, Oral, Daily    coenzyme Q10 100 mg, Oral, 2 times daily    cyanocobalamin (VITAMIN B-12) 50 mcg, Oral, Daily    diphenhydrAMINE-acetaminophen (TYLENOL PM)  mg Tab 1 tablet, Oral, Nightly    EScitalopram oxalate (LEXAPRO) 10 mg, Oral, Daily    ezetimibe (ZETIA) 10 mg, Oral, Daily    FARXIGA 10 mg, Oral, Daily    finasteride (PROSCAR) 5 mg, Oral, Daily    fish,bora,flax oils-om3,6,9no1 (OMEGA 3-6-9) 1,200 mg Cap 1 capsule, Oral, Daily    furosemide (LASIX) 20 mg, Oral, Daily    levomefolate-algal oil (L-METHYLFOLATE FORTE) 7.5-90.314 mg Cap 600 mg, Oral, Daily    levothyroxine (SYNTHROID) 50 mcg, Oral, Daily    magnesium oxide,aspartate,citr (TRIPLE MAGNESIUM COMPLEX) 400 mg magnesium Cap 1 tablet, Oral, Daily    metoprolol succinate (TOPROL-XL) 25 mg, Oral, Daily    molnupiravir 800 mg, Oral, Every 12 hours    nitroGLYCERIN (NITROSTAT) 0.4 MG SL tablet 1 tablet, Sublingual, As needed (PRN)    Saccharomyces boulardii (FLORASTOR) 250 mg, Oral, Daily    sertraline (ZOLOFT) 100 mg, Oral, Daily    tamsulosin (FLOMAX) 0.8 mg, Oral, Daily    traZODone (DESYREL) 75 mg,  "Oral    vitamin E acid succinate (VITAMIN E SUCCINATE) 134 mg (200 unit) Tab 1 tablet, Oral, Daily     Past Surgical History:   Procedure Laterality Date    CAROTID ENDARTERECTOMY      COLON RESECTION      EXTRACTION OF CATARACT Left 10/11/2022    Dr. Wells    FLEXIBLE LARYNGOSCOPY WITH DRUG-INDUCED SEDATION FOR EVALUATION OF SLEEP APNEA      FLEXIBLE LARYNGOSCOPY WITH DRUG-INDUCED SEDATION FOR EVALUATION OF SLEEP APNEA      Quadruple bypass      ROTATOR CUFF REPAIR Bilateral     TONSILLECTOMY AND ADENOIDECTOMY      TOTAL KNEE REPLACEMENT USING COMPUTER NAVIGATION Bilateral      Family History   Problem Relation Age of Onset    Heart disease Mother     Heart disease Father     Heart disease Sister     Stroke Sister        Social History     Tobacco Use    Smoking status: Never    Smokeless tobacco: Never   Substance Use Topics    Alcohol use: Not Currently    Drug use: Never        Physical Exam      Vital Signs Reviewed   /86   Pulse 76   Temp 97.5 °F (36.4 °C) (Oral)   Resp 18   Ht 5' 5" (1.651 m)   Wt 71.2 kg (157 lb)   SpO2 100%   BMI 26.13 kg/m²        Procedures    Procedures     Labs     Results for orders placed or performed in visit on 11/26/23   POCT COVID-19 Rapid Screening   Result Value Ref Range    POC Rapid COVID Negative Negative     Acceptable Yes        Due to the patient having a positive at home COVID test I did offer antiviral medication.  A prescription for molnupiravir was sent to the pharmacy.    Assessment:     1. Nasal congestion        Plan:       Nasal congestion  -     POCT COVID-19 Rapid Screening    Other orders  -     molnupiravir 200 mg capsule (EUA); Take 4 capsules (800 mg total) by mouth every 12 (twelve) hours. for 5 days  Dispense: 40 capsule; Refill: 0      Fever / Body Aches: Use OTC Tylenol per package instructions for fever or body aches.  Sore Throat: Use OTC lozenges or throat sprays, gargle with warm salt and water, warm tea with honey.   Cough: "   Cover your mouth with coughing, avoid sharing cups or lip balm, wash your hand before eating or touching your face.    Please quarantine for 5 days from the onset of symptoms. On day 6 of illness, you can return to work/school as long as you have not experienced fever in the last 24 hours.   Seek emergency care for breathing difficulties, chest pain, shortness of breath, or confusion. Given the nature of this disease, severe respiratory distress can develop suddenly on day 8 or 9 of clinical course.  Follow up with you primary care doctor as needed.      Updated CDC guidelines can be found at: https://www.cdc.gov/coronavirus/2019-ncov/hcp/ywlajs-id-icvo.html

## 2023-11-26 NOTE — PATIENT INSTRUCTIONS
Fever / Body Aches: Use OTC Tylenol per package instructions for fever or body aches.  Sore Throat: Use OTC lozenges or throat sprays, gargle with warm salt and water, warm tea with honey.   Cough:   Cover your mouth with coughing, avoid sharing cups or lip balm, wash your hand before eating or touching your face.    Please quarantine for 5 days from the onset of symptoms. On day 6 of illness, you can return to work/school as long as you have not experienced fever in the last 24 hours.   Seek emergency care for breathing difficulties, chest pain, shortness of breath, or confusion. Given the nature of this disease, severe respiratory distress can develop suddenly on day 8 or 9 of clinical course.  Follow up with you primary care doctor as needed.      Updated CDC guidelines can be found at: https://www.cdc.gov/coronavirus/2019-ncov/hcp/adttvh-qp-bink.html

## 2023-11-27 NOTE — PROGRESS NOTES
Please let patient know MRI is stable from prior and shows abnormally developed blood vessels. This is an incidental finding and should be nothing to worry about.  Make sure has follow up appointment and we can discuss whether to repeat imaging in 1 year at next visit

## 2023-11-28 ENCOUNTER — TELEPHONE (OUTPATIENT)
Dept: NEUROLOGY | Facility: CLINIC | Age: 83
End: 2023-11-28
Payer: MEDICARE

## 2023-11-28 NOTE — TELEPHONE ENCOUNTER
Pt's spouse informed MRI stable from previous and showed abnormally developed blood vessels it is an incidental finding and should be nothing to worry about. Appointment in one year on 7/16/24     Spouse stated Pt recently had procedure done and seems to have increased memory difficulty informed Pt with anesthesia this may occur but will check with provider for further recommendation

## 2023-11-28 NOTE — TELEPHONE ENCOUNTER
----- Message from Joan Dao MD sent at 11/27/2023  4:48 PM CST -----  Please let patient know MRI is stable from prior and shows abnormally developed blood vessels. This is an incidental finding and should be nothing to worry about.  Make sure has follow up appointment and we can discuss whether to repeat imaging in 1 year at next visit

## 2024-01-16 ENCOUNTER — HOSPITAL ENCOUNTER (OUTPATIENT)
Dept: RADIOLOGY | Facility: HOSPITAL | Age: 84
Discharge: HOME | End: 2024-01-16
Attending: OTOLARYNGOLOGY
Payer: MEDICARE

## 2024-01-16 DIAGNOSIS — Z01.818 OTHER SPECIFIED PRE-OPERATIVE EXAMINATION: ICD-10-CM

## 2024-01-16 DIAGNOSIS — H02.409 BLEPHAROPTOSIS: Primary | ICD-10-CM

## 2024-01-16 DIAGNOSIS — Z79.01 LONG TERM (CURRENT) USE OF ANTICOAGULANTS: ICD-10-CM

## 2024-01-16 DIAGNOSIS — H02.409 BLEPHAROPTOSIS: ICD-10-CM

## 2024-01-16 PROCEDURE — 71046 X-RAY EXAM CHEST 2 VIEWS: CPT | Mod: TC

## 2024-06-26 ENCOUNTER — OFFICE VISIT (OUTPATIENT)
Dept: NEUROLOGY | Facility: CLINIC | Age: 84
End: 2024-06-26
Payer: MEDICARE

## 2024-06-26 VITALS
HEART RATE: 67 BPM | BODY MASS INDEX: 26.99 KG/M2 | HEIGHT: 65 IN | SYSTOLIC BLOOD PRESSURE: 106 MMHG | DIASTOLIC BLOOD PRESSURE: 66 MMHG | WEIGHT: 162 LBS

## 2024-06-26 DIAGNOSIS — D18.00 CAVERNOUS ANGIOMA: ICD-10-CM

## 2024-06-26 DIAGNOSIS — R41.3 OTHER AMNESIA: ICD-10-CM

## 2024-06-26 DIAGNOSIS — G31.84 MILD COGNITIVE IMPAIRMENT: Primary | ICD-10-CM

## 2024-06-26 PROCEDURE — 3078F DIAST BP <80 MM HG: CPT | Mod: CPTII,S$GLB,, | Performed by: NURSE PRACTITIONER

## 2024-06-26 PROCEDURE — 99214 OFFICE O/P EST MOD 30 MIN: CPT | Mod: S$GLB,,, | Performed by: NURSE PRACTITIONER

## 2024-06-26 PROCEDURE — 3074F SYST BP LT 130 MM HG: CPT | Mod: CPTII,S$GLB,, | Performed by: NURSE PRACTITIONER

## 2024-06-26 PROCEDURE — 99999 PR PBB SHADOW E&M-EST. PATIENT-LVL IV: CPT | Mod: PBBFAC,,, | Performed by: NURSE PRACTITIONER

## 2024-06-26 PROCEDURE — 3288F FALL RISK ASSESSMENT DOCD: CPT | Mod: CPTII,S$GLB,, | Performed by: NURSE PRACTITIONER

## 2024-06-26 PROCEDURE — 1101F PT FALLS ASSESS-DOCD LE1/YR: CPT | Mod: CPTII,S$GLB,, | Performed by: NURSE PRACTITIONER

## 2024-06-26 PROCEDURE — 1159F MED LIST DOCD IN RCRD: CPT | Mod: CPTII,S$GLB,, | Performed by: NURSE PRACTITIONER

## 2024-06-26 RX ORDER — DRONEDARONE 400 MG/1
1 TABLET, FILM COATED ORAL 2 TIMES DAILY
COMMUNITY
Start: 2024-06-13

## 2024-06-26 NOTE — ASSESSMENT & PLAN NOTE
-okay to continue namenda since dizziness upon standing is tolerable.  Wife will let us know if he has any falls as a result of namenda which would warrant discontinuation of med

## 2024-06-26 NOTE — PROGRESS NOTES
Subjective:       Patient ID: Jovany Reynolds is a 83 y.o. male.    Chief Complaint: Dizziness (Pt's wife states difficulty with dizziness still present episodes of confusion, laps of time, incoherent) and Memory Loss (Pt's wife states since having surgery August 2023 increased difficulty with memory specifically with short term memory )      History of Present Illness:  Follow up visit for memory and dizziness.  Memory has progressively worsened over the past year.  Had sharp decline after anesthesia in August.  Still has difficulty with forgetting conversations, repeating himself, misplacing things.  Still independent with all ADLs.  Not wearing hearing aides so hearing is still problematic.  No longer taking tylenol PM for sleep.  Just taking supplement with melatonin.     Dizziness upon standing still occurring.  He is now back on namenda, but they are unsure when or how it was restarted. Unsure if any correlation between resuming the medication and return of dizziness.  Wife does not feel it isvery problematic.  Has not caused falls.  Patient says it is tolerable and he is just careful when he stands up.              Past Medical History:   Diagnosis Date    A-fib     Anxiety disorder, unspecified     CAD (coronary artery disease)     CHF (congestive heart failure)     Chronic back pain     Chronic kidney disease, unspecified     Depression     Dizziness     Hard of hearing     Sleep apnea, unspecified     Stroke     Thyroid disease     Unspecified cataract     Unspecified dementia without behavioral disturbance     Unspecified sensorineural hearing loss     Urinary retention        Past Surgical History:   Procedure Laterality Date    ABLATION      AORTIC VALVE REPLACEMENT      BELPHAROPTOSIS REPAIR Bilateral 2/23/2024    Procedure: BILATERAL  PTOSIS REPAIR;  Surgeon: Valdo Aguero MD;  Location: [facility];  Service: ENT;  Laterality: Bilateral;    CARDIAC SURGERY      CAROTID ENDARTERECTOMY      COLON  RESECTION      COLON SURGERY      EXTRACTION OF CATARACT Left 10/11/2022    Dr. Wells    FISTULA REPAIR      FLEXIBLE LARYNGOSCOPY WITH DRUG-INDUCED SEDATION FOR EVALUATION OF SLEEP APNEA      FLEXIBLE LARYNGOSCOPY WITH DRUG-INDUCED SEDATION FOR EVALUATION OF SLEEP APNEA      Quadruple bypass      ROTATOR CUFF REPAIR Bilateral     TONSILLECTOMY      TONSILLECTOMY AND ADENOIDECTOMY      TOTAL KNEE REPLACEMENT USING COMPUTER NAVIGATION Bilateral         Family History   Problem Relation Name Age of Onset    Heart disease Mother      Heart disease Father      Heart disease Sister      Stroke Sister          Social History     Socioeconomic History    Marital status:    Tobacco Use    Smoking status: Never    Smokeless tobacco: Never   Substance and Sexual Activity    Alcohol use: Never    Drug use: Never     Social Determinants of Health     Financial Resource Strain: Low Risk  (8/22/2023)    Received from [facility], [facility]    Overall Financial Resource Strain (CARDIA)     Difficulty of Paying Living Expenses: Not hard at all        Outpatient Encounter Medications as of 6/26/2024   Medication Sig Dispense Refill    ascorbic acid, vitamin C, (VITAMIN C) 1000 MG tablet Take 2,000 mg by mouth Daily.      aspirin 81 MG Chew Take 81 mg by mouth once daily.      cholecalciferol, vitamin D3, (VITAMIN D3) 50 mcg (2,000 unit) Tab Take 2,000 Units by mouth Daily.      coenzyme Q10 100 mg capsule Take 100 mg by mouth 2 (two) times daily.      cyanocobalamin (VITAMIN B-12) 100 MCG tablet Take 50 mcg by mouth Daily.      diphenhydrAMINE-acetaminophen (TYLENOL PM)  mg Tab Take 1 tablet by mouth every evening.      ezetimibe (ZETIA) 10 mg tablet Take 10 mg by mouth once daily.      fish,bora,flax oils-om3,6,9no1 (OMEGA 3-6-9) 1,200 mg Cap Take 1 capsule by mouth Daily.       furosemide (LASIX) 40 MG tablet Take 20 mg by mouth once daily.      levomefolate-algal oil (L-METHYLFOLATE FORTE) 7.5-90.314 mg Cap Take 600 mg by mouth Daily.      levothyroxine (SYNTHROID) 50 MCG tablet Take 50 mcg by mouth once daily.      magnesium oxide,aspartate,citr (TRIPLE MAGNESIUM COMPLEX) 400 mg magnesium Cap Take 1 tablet by mouth Daily.      memantine (NAMENDA) 10 MG Tab Take 10 mg by mouth 2 (two) times daily.      MULTAQ 400 mg Tab Take 1 tablet by mouth 2 (two) times daily.      nitroGLYCERIN (NITROSTAT) 0.4 MG SL tablet Place 1 tablet under the tongue as needed.      sertraline (ZOLOFT) 100 MG tablet Take 100 mg by mouth Daily.      tamsulosin (FLOMAX) 0.4 mg Cap Take 0.8 mg by mouth Daily.      vitamin E acid succinate (VITAMIN E SUCCINATE) 134 mg (200 unit) Tab Take 1 tablet by mouth Daily.      [DISCONTINUED] EScitalopram oxalate (LEXAPRO) 10 MG tablet Take 10 mg by mouth once daily. (Patient not taking: Reported on 6/26/2024)      [DISCONTINUED] FARXIGA 10 mg tablet Take 10 mg by mouth Daily. (Patient not taking: Reported on 6/26/2024)      [DISCONTINUED] finasteride (PROSCAR) 5 mg tablet Take 5 mg by mouth Daily. (Patient not taking: Reported on 6/26/2024)      [DISCONTINUED] l-methylfolate-b2-b6-b12 (CEREFOLIN) 6-5-50-1 mg Tab Take 1 tablet by mouth every morning. (Patient not taking: Reported on 6/26/2024)      [DISCONTINUED] metoprolol succinate (TOPROL-XL) 25 MG 24 hr tablet Take 25 mg by mouth once daily. (Patient not taking: Reported on 6/26/2024)      [DISCONTINUED] Saccharomyces boulardii (FLORASTOR) 250 mg capsule Take 250 mg by mouth Daily. (Patient not taking: Reported on 6/26/2024)      [DISCONTINUED] traZODone (DESYREL) 50 MG tablet Take 75 mg by mouth. (Patient not taking: Reported on 6/26/2024)       Facility-Administered Encounter Medications as of 6/26/2024   Medication Dose Route Frequency Provider Last Rate Last Admin    lactated ringers infusion   Intravenous  "Continuous Valdo Aguero MD          Objective:   /66   Pulse 67   Ht 5' 5" (1.651 m)   Wt 73.5 kg (162 lb)   BMI 26.96 kg/m²        Physical Exam  General:  Alert and oriented  NAD  No overt edema    Cognition and Comprehension:  Speech and language intact  Follows commands    Cranial nerves:   CN 2_ Visual fields (full to confrontation both eyes)  CN 3, 4, 6_ Intact, DANICA, no nystagmus  CN 5_facial tactile sensation intact  CN 7_no face asymmetry; normal eye closure and smile  CN 8_very [place]  CN 9, 10, 11_voice normal, shoulder shrug ok; deltoids not fatigable   CN 12 tongue_protrudes mid line    Muscle Strength and Tone:  Normal upper extremity tone  Normal lower extremity tone  Normal upper extremity strength  Normal lower extremity strength    Coordination and Gait:  Coordination and gait are normal       Assessment & Plan:      1. Mild cognitive impairment  Overview:  Initially evaluated July 20, 2023 for episodic confusion and poor short-term memory.  Has a history of stroke in 2011 which affected his peripheral vision.  He was started on Aricept and Namenda prior to being seen here, but both medications exacerbated pre-existing dizziness.  MRI brain November 20, 2023 stable right parietal and right frontal cavernous angiomas, right parietal angioma has stable small inferior peripheral focus of enhancement.  Recommended follow-up MRI in 12 months    MOCAs:  07/2023: 25/30 06/2024: 17/30    Assessment & Plan:  -okay to continue namenda since dizziness upon standing is tolerable.  Wife will let us know if he has any falls as a result of namenda which would warrant discontinuation of med      2. Cavernous angioma  Assessment & Plan:  -repeat MRI brain      3. Other amnesia  -     MRI Brain W WO Contrast; Future; Expected date: 06/26/2024  -     Creatinine, serum; Future; Expected date: 06/26/2024          This note was created with the assistance of voice recognition software. There may be " transcription errors as a result of using this technology however minimal. Effort has been made to assure accuracy of transcription but any obvious errors or omissions should be clarified with the author of the document.

## 2024-07-10 ENCOUNTER — TELEPHONE (OUTPATIENT)
Dept: NEUROLOGY | Facility: CLINIC | Age: 84
End: 2024-07-10
Payer: MEDICARE

## 2024-07-10 NOTE — TELEPHONE ENCOUNTER
----- Message from NEIDA Rogers sent at 7/9/2024  9:07 AM CDT -----  Mri brain is stable.  No increase in size of the abnormally developed blood vessels that we have been watching

## 2024-11-04 DIAGNOSIS — I70.1 RENAL ARTERY STENOSIS: Primary | ICD-10-CM

## 2024-11-27 NOTE — PROGRESS NOTES
"    [facility] Vascular - Clinic Note  Christine Lara MD      Patient Name: Jovany Reynolds                   : 1940      MRN: [id number]   Visit Date: 2024       History Present Illness     Reason for Visit:  Renal Artery Stenosis    Mr. Reynolds presents to the clinic for vascular evaluation secondary to renal artery stenosis. He is being referred by his cardiologist, Dr. Valentino. Renal duplex was obtained secondary to symptomatic hypertension and elevated creatinine. MRA was obtained as well.     He reports one episode of hypertension and denies episodes since. He reports open heart surgery in 2017 and went into renal failure during recovery. He did have recovery of his kidney function.  He is following Dr. Huynh and reports his numbers have been great. He sees him on Monday.    He has a history of right carotid endarterectomy done by Dr. Workman in . He remains on carotid surveillance with Dr. Valentino. He has a history of stroke in the past.       REVIEW OF SYSTEMS:  Review of systems conducted, negative except as stated in the history of present illness. See HPI for details.        Physical Exam      Vitals:    24 0914 24 0915   BP: 127/73 126/77   BP Location: Right arm Left arm   Patient Position: Sitting Sitting   Pulse: 67 65   Weight: 75.8 kg (167 lb)    Height: 5' 5" (1.651 m)           General: well-nourished, no acute distress, and healthy appearing, ambulating normally, alert, pleasant, conversant, and oriented  Neurologic: cranial nerves are grossly intact, no neurologic deficits, no motor deficits, and no sensory deficits  HEENT: grossly normal and no scleral icterus  Neck/Chest: normal  and soft without lymphadenopathy  Respiratory: breathing easily and without respiratory distress  Abdomen: normal and soft  Cardiology: regular rate and rhythm    Upper Extremity Arterial Exam:   Right - radial is palpable  Left - radial is palpable    Lower Extremity Arterial " Exam:  Right - posterior tibial is non-palpable and dorsalis pedis is lightly palpable  Left - posterior tibial is non-palpable and dorsalis pedis is non-palpable    Musculoskeletal:   Upper Extremity: normal bilateral hand function  Lower Extremity: no edema present to bilateral lower extremities            Assessment and Plan     Mr. Reynolds is a 84 y.o. with hypertension and elevated creatinine who has mild renal artery stenosis. I personally reviewed the images from his recent MRA and previous CT which demonstrates patent renal arteries without focal stenosis. There is mild circumferential plaque. Renal duplex demonstrates adequate peak systolic velocities through both arteries. I do not recommend intervention. Will obtain follow up imaging in 6 months to insure stability.       1. Renal artery stenosis  - Ambulatory referral/consult to Vascular Surgery  - CV Ultrasound renal artery (Cupid Only); Future          Imaging Obtained/Reviewed   Study: {[facility] imaging studies:16644}  Date:   ***        Medical History     Past Medical History:   Diagnosis Date    A-fib     Anxiety disorder, unspecified     CAD (coronary artery disease)     CHF (congestive heart failure)     Chronic back pain     Chronic kidney disease, unspecified     Depression     Dizziness     Hard of hearing     Sleep apnea, unspecified     Stroke     Thyroid disease     Unspecified cataract     Unspecified dementia without behavioral disturbance     Unspecified sensorineural hearing loss     Urinary retention      Past Surgical History:   Procedure Laterality Date    ABLATION      AORTIC VALVE REPLACEMENT      BELPHAROPTOSIS REPAIR Bilateral 02/23/2024    Procedure: BILATERAL  PTOSIS REPAIR;  Surgeon: Valdo Aguero MD;  Location: [facility];  Service: ENT;  Laterality: Bilateral;    CARDIAC SURGERY      CAROTID ENDARTERECTOMY Right 2010    Workman    COLON RESECTION  2023    COLON SURGERY      EXTRACTION OF CATARACT Left 10/11/2022    Dr. Wells     FISTULA REPAIR      FLEXIBLE LARYNGOSCOPY WITH DRUG-INDUCED SEDATION FOR EVALUATION OF SLEEP APNEA      FLEXIBLE LARYNGOSCOPY WITH DRUG-INDUCED SEDATION FOR EVALUATION OF SLEEP APNEA      Quadruple bypass      ROTATOR CUFF REPAIR Bilateral     TONSILLECTOMY      TONSILLECTOMY AND ADENOIDECTOMY      TOTAL KNEE REPLACEMENT USING COMPUTER NAVIGATION Bilateral      Family History   Problem Relation Name Age of Onset    Heart disease Mother      Heart disease Father      Heart disease Sister      Stroke Sister       Social History     Socioeconomic History    Marital status:    Tobacco Use    Smoking status: Never    Smokeless tobacco: Never   Substance and Sexual Activity    Alcohol use: Never    Drug use: Never     Social Drivers of Health     Financial Resource Strain: Low Risk  (8/22/2023)    [facility] from [facility] [facility], [facility]    Overall Financial Resource Strain (CARDIA)     Difficulty of Paying Living Expenses: Not hard at all     Current Outpatient Medications   Medication Instructions    ascorbic acid (vitamin C) (VITAMIN C) 2,000 mg, Daily    aspirin 81 mg, Daily    cholecalciferol (vitamin D3) (VITAMIN D3) 2,000 Units, Daily    coenzyme Q10 100 mg, 2 times daily    cyanocobalamin (VITAMIN B-12) 50 mcg, Daily    ezetimibe (ZETIA) 10 mg, Daily    fish,bora,flax oils-om3,6,9no1 (OMEGA 3-6-9) 1,200 mg Cap 1 capsule, Daily    furosemide (LASIX) 20 mg, Daily    levomefolate-algal oil (L-METHYLFOLATE FORTE) 7.5-90.314 mg Cap 600 mg, Daily    levothyroxine (SYNTHROID) 50 mcg, Daily    magnesium oxide,aspartate,citr (TRIPLE MAGNESIUM COMPLEX) 400 mg magnesium Cap 1 tablet, Daily    memantine (NAMENDA) 10 mg, 2 times daily    MULTAQ 400 mg Tab 1 tablet, 2 times daily    nitroGLYCERIN (NITROSTAT) 0.4 MG SL tablet 1 tablet, As needed (PRN)    sertraline (ZOLOFT)  100 mg, Daily    tamsulosin (FLOMAX) 0.8 mg, Daily    vitamin E acid succinate (VITAMIN E SUCCINATE) 134 mg (200 unit) Tab 1 tablet, Daily     Review of patient's allergies indicates:  No Known Allergies    Patient Care Team:  Aj Gomez MD as PCP - General (Internal Medicine)  Valentino, Vernon A., MD as Consulting Physician (Cardiology)  Yury Huynh DO as Consulting Physician (Nephrology)  Christine Lara MD as Consulting Physician (Vascular Surgery)        No follow-ups on file. In addition to their scheduled follow up, the patient has also been instructed to follow up on as needed basis.     Future Appointments   Date Time Provider Department Center   6/30/2025 10:00 AM Joan Dao MD [address]

## 2024-12-06 ENCOUNTER — OFFICE VISIT (OUTPATIENT)
Dept: VASCULAR SURGERY | Facility: CLINIC | Age: 84
End: 2024-12-06
Payer: MEDICARE

## 2024-12-06 VITALS
HEART RATE: 65 BPM | WEIGHT: 167 LBS | HEIGHT: 65 IN | SYSTOLIC BLOOD PRESSURE: 126 MMHG | BODY MASS INDEX: 27.82 KG/M2 | DIASTOLIC BLOOD PRESSURE: 77 MMHG

## 2024-12-06 DIAGNOSIS — I70.1 RENAL ARTERY STENOSIS: ICD-10-CM

## 2024-12-06 DIAGNOSIS — I70.1 RENAL ARTERY STENOSIS: Primary | ICD-10-CM

## 2024-12-06 PROCEDURE — 99203 OFFICE O/P NEW LOW 30 MIN: CPT | Mod: ,,, | Performed by: SPECIALIST

## 2024-12-06 PROCEDURE — 1160F RVW MEDS BY RX/DR IN RCRD: CPT | Mod: CPTII,,, | Performed by: SPECIALIST

## 2024-12-06 PROCEDURE — 1159F MED LIST DOCD IN RCRD: CPT | Mod: CPTII,,, | Performed by: SPECIALIST

## 2024-12-06 PROCEDURE — 3078F DIAST BP <80 MM HG: CPT | Mod: CPTII,,, | Performed by: SPECIALIST

## 2024-12-06 PROCEDURE — 3074F SYST BP LT 130 MM HG: CPT | Mod: CPTII,,, | Performed by: SPECIALIST

## 2024-12-06 PROCEDURE — 1101F PT FALLS ASSESS-DOCD LE1/YR: CPT | Mod: CPTII,,, | Performed by: SPECIALIST

## 2024-12-06 PROCEDURE — 3288F FALL RISK ASSESSMENT DOCD: CPT | Mod: CPTII,,, | Performed by: SPECIALIST

## 2024-12-07 NOTE — PROGRESS NOTES
"    [facility] Vascular - Clinic Note  Christine Lara MD      Patient Name: Jovany Reynolds                   : 1940      MRN: [id number]   Visit Date: 2024       History Present Illness     Reason for Visit:  Renal Artery Stenosis    Mr. Reynolds presents to the clinic for vascular evaluation secondary to renal artery stenosis. He is being referred by his cardiologist, Dr. Valentino. Renal duplex was obtained secondary to symptomatic hypertension and elevated creatinine. MRA was obtained as well.     He reports one episode of hypertension and denies episodes since. He reports open heart surgery in 2017 and went into renal failure during recovery. He did have recovery of his kidney function.  He is following Dr. Huynh and reports his numbers have been great. He sees him on Monday.    He has a history of right carotid endarterectomy done by Dr. Workman in . He remains on carotid surveillance with Dr. Valentino. He has a history of stroke in the past.       REVIEW OF SYSTEMS:  Review of systems conducted, negative except as stated in the history of present illness. See HPI for details.        Physical Exam      Vitals:    24 0914 24 0915   BP: 127/73 126/77   BP Location: Right arm Left arm   Patient Position: Sitting Sitting   Pulse: 67 65   Weight: 75.8 kg (167 lb)    Height: 5' 5" (1.651 m)           General: well-nourished, no acute distress, and healthy appearing, ambulating normally, alert, pleasant, conversant, and oriented  Neurologic: cranial nerves are grossly intact, no neurologic deficits, no motor deficits, and no sensory deficits  HEENT: grossly normal and no scleral icterus  Neck/Chest: normal  and soft without lymphadenopathy  Respiratory: breathing easily and without respiratory distress  Abdomen: normal and soft  Cardiology: regular rate and rhythm    Upper Extremity Arterial Exam:   Right - radial is palpable  Left - radial is palpable    Lower Extremity Arterial " Exam:  Right - posterior tibial is non-palpable and dorsalis pedis is lightly palpable  Left - posterior tibial is non-palpable and dorsalis pedis is non-palpable    Musculoskeletal:   Upper Extremity: normal bilateral hand function  Lower Extremity: no edema present to bilateral lower extremities            Assessment and Plan     Mr. Reynolds is an 84 y.o. man who has mild renal artery stenosis without significant hypertension or kidney insufficiency. I personally reviewed the images from his previous CT (last year) which demonstrates patent renal arteries with mild atherosclerosis and no focal stenosis.  Renal duplex has a marginal RAR but the peak systolic velocities are very mild and nowhere near the cutoffs for significant stenosis.  I do not think there is significant renal artery stenosis.   Even if there were, without significant hypertension or CKD, revascularization would be of minimal benefit.   I do not recommend procedural intervention. Will obtain follow up imaging in 6 months to insure stability.       1. Renal artery stenosis  - Ambulatory referral/consult to Vascular Surgery  - CV Ultrasound renal artery (Cupid Only); Future          Imaging Obtained/Reviewed           Medical History     Past Medical History:   Diagnosis Date    A-fib     Anxiety disorder, unspecified     CAD (coronary artery disease)     CHF (congestive heart failure)     Chronic back pain     Chronic kidney disease, unspecified     Depression     Dizziness     Hard of hearing     Sleep apnea, unspecified     Stroke     Thyroid disease     Unspecified cataract     Unspecified dementia without behavioral disturbance     Unspecified sensorineural hearing loss     Urinary retention      Past Surgical History:   Procedure Laterality Date    ABLATION      AORTIC VALVE REPLACEMENT      BELPHAROPTOSIS REPAIR Bilateral 02/23/2024    Procedure: BILATERAL  PTOSIS REPAIR;  Surgeon: Valdo Aguero MD;  Location: [facility];  Service: ENT;   Laterality: Bilateral;    CARDIAC SURGERY      CAROTID ENDARTERECTOMY Right 2010    Workman    COLON RESECTION  2023    COLON SURGERY      EXTRACTION OF CATARACT Left 10/11/2022    Dr. Wells    FISTULA REPAIR      FLEXIBLE LARYNGOSCOPY WITH DRUG-INDUCED SEDATION FOR EVALUATION OF SLEEP APNEA      FLEXIBLE LARYNGOSCOPY WITH DRUG-INDUCED SEDATION FOR EVALUATION OF SLEEP APNEA      Quadruple bypass      ROTATOR CUFF REPAIR Bilateral     TONSILLECTOMY      TONSILLECTOMY AND ADENOIDECTOMY      TOTAL KNEE REPLACEMENT USING COMPUTER NAVIGATION Bilateral      Family History   Problem Relation Name Age of Onset    Heart disease Mother      Heart disease Father      Heart disease Sister      Stroke Sister       Social History     Socioeconomic History    Marital status:    Tobacco Use    Smoking status: Never    Smokeless tobacco: Never   Substance and Sexual Activity    Alcohol use: Never    Drug use: Never     Social Drivers of Health     Financial Resource Strain: Low Risk  (8/22/2023)    Received from [employer] [facility] and [facility] Subsidiaries and Affiliates, [employer] [facility] Subsidiaries and Affiliates    Overall Financial Resource Strain (CARDIA)     Difficulty of Paying Living Expenses: Not hard at all     Current Outpatient Medications   Medication Instructions    ascorbic acid (vitamin C) (VITAMIN C) 2,000 mg, Daily    aspirin 81 mg, Daily    cholecalciferol (vitamin D3) (VITAMIN D3) 2,000 Units, Daily    coenzyme Q10 100 mg, 2 times daily    cyanocobalamin (VITAMIN B-12) 50 mcg, Daily    ezetimibe (ZETIA) 10 mg, Daily    fish,bora,flax oils-om3,6,9no1 (OMEGA 3-6-9) 1,200 mg Cap 1 capsule, Daily    furosemide (LASIX) 20 mg, Daily    levomefolate-algal oil (L-METHYLFOLATE FORTE) 7.5-90.314 mg Cap 600 mg, Daily    levothyroxine (SYNTHROID) 50 mcg, Daily    magnesium oxide,aspartate,citr (TRIPLE MAGNESIUM COMPLEX) 400 mg magnesium Cap 1 tablet,  Daily    memantine (NAMENDA) 10 mg, 2 times daily    MULTAQ 400 mg Tab 1 tablet, 2 times daily    nitroGLYCERIN (NITROSTAT) 0.4 MG SL tablet 1 tablet, As needed (PRN)    sertraline (ZOLOFT) 100 mg, Daily    tamsulosin (FLOMAX) 0.8 mg, Daily    vitamin E acid succinate (VITAMIN E SUCCINATE) 134 mg (200 unit) Tab 1 tablet, Daily     Review of patient's allergies indicates:  No Known Allergies    Patient Care Team:  Aj Gomez MD as PCP - General (Internal Medicine)  Valentino, Vernon A., MD as Consulting Physician (Cardiology)  Yury Huynh DO as Consulting Physician (Nephrology)  Christine Lara MD as Consulting Physician (Vascular Surgery)        No follow-ups on file. In addition to their scheduled follow up, the patient has also been instructed to follow up on as needed basis.     Future Appointments   Date Time Provider Department Center   6/30/2025 10:00 AM Joan Dao MD [address]

## 2024-12-27 ENCOUNTER — OFFICE VISIT (OUTPATIENT)
Dept: URGENT CARE | Facility: CLINIC | Age: 84
End: 2024-12-27
Payer: MEDICARE

## 2024-12-27 VITALS
DIASTOLIC BLOOD PRESSURE: 89 MMHG | OXYGEN SATURATION: 97 % | TEMPERATURE: 98 F | BODY MASS INDEX: 28.49 KG/M2 | HEART RATE: 90 BPM | RESPIRATION RATE: 20 BRPM | SYSTOLIC BLOOD PRESSURE: 155 MMHG | WEIGHT: 171 LBS | HEIGHT: 65 IN

## 2024-12-27 DIAGNOSIS — R05.1 ACUTE COUGH: ICD-10-CM

## 2024-12-27 DIAGNOSIS — R09.81 NASAL CONGESTION: Primary | ICD-10-CM

## 2024-12-27 LAB
CTP QC/QA: YES
CTP QC/QA: YES
POC MOLECULAR INFLUENZA A AGN: NEGATIVE
POC MOLECULAR INFLUENZA B AGN: NEGATIVE
SARS-COV-2 AG RESP QL IA.RAPID: NEGATIVE

## 2024-12-27 RX ORDER — PROMETHAZINE HYDROCHLORIDE AND DEXTROMETHORPHAN HYDROBROMIDE 6.25; 15 MG/5ML; MG/5ML
5 SYRUP ORAL EVERY 8 HOURS PRN
Qty: 118 ML | Refills: 0 | Status: SHIPPED | OUTPATIENT
Start: 2024-12-27 | End: 2025-01-01

## 2024-12-27 NOTE — PROGRESS NOTES
"Subjective:      Patient ID: Jovany Reynolds is a 84 y.o. male.    Vitals:  height is 5' 5" (1.651 m) and weight is 77.6 kg (171 lb). His temperature is 98.2 °F (36.8 °C). His blood pressure is 155/89 (abnormal) and his pulse is 90. His respiration is 20 and oxygen saturation is 97%.     Chief Complaint: Nasal Congestion    Male with history of AFib reports acute cough and nasal congestion onset this morning transported by wife to urgent Care for evaluation.  Wife reports multiple viral sick contacts including two influenza family members and grandchild RSV this [place] 3 days ago.    Cough  This is a new problem. The current episode started today. Pertinent negatives include no chest pain, ear pain, fever, headaches, shortness of breath or wheezing.     Constitution: Negative for fever.   HENT:  Positive for congestion and sinus pressure. Negative for ear pain, sinus pain, trouble swallowing and voice change.    Neck: Negative for neck pain and neck stiffness.   Cardiovascular:  Negative for chest pain and palpitations.   Respiratory:  Positive for cough. Negative for shortness of breath and wheezing.    Gastrointestinal:  Negative for vomiting and diarrhea.   Musculoskeletal: Negative.    Skin: Negative.  Negative for erythema.   Allergic/Immunologic: Negative.    Neurological:  Negative for dizziness and headaches.      Objective:     Physical Exam   Constitutional: He is oriented to person, place, and time. He appears well-developed. He is cooperative.  Non-toxic appearance.      Comments:Awake alert pleasant ambulatory nasally congested male attended by wife     HENT:   Head: Normocephalic and atraumatic.   Ears:   Right Ear: Hearing, tympanic membrane, external ear and ear canal normal. Tympanic membrane is not erythematous and not bulging.   Left Ear: Hearing, tympanic membrane, external ear and ear canal normal. Tympanic membrane is not erythematous and not bulging.   Nose: Mucosal edema and congestion " present. No rhinorrhea, purulent discharge or nasal deformity. No epistaxis. Right sinus exhibits no maxillary sinus tenderness and no frontal sinus tenderness. Left sinus exhibits no maxillary sinus tenderness and no frontal sinus tenderness.   Mouth/Throat: Uvula is midline, oropharynx is clear and moist and mucous membranes are normal. Mucous membranes are moist. No trismus in the jaw. Normal dentition. No uvula swelling. No oropharyngeal exudate, posterior oropharyngeal edema or posterior oropharyngeal erythema.   Eyes: Conjunctivae and lids are normal. No scleral icterus.   Neck: Trachea normal and phonation normal. Neck supple. No edema present. No erythema present. No neck rigidity present.   Cardiovascular: Normal rate, regular rhythm, normal heart sounds and normal pulses.   Pulmonary/Chest: Effort normal and breath sounds normal. No stridor. No respiratory distress. He has no decreased breath sounds. He has no wheezes. He has no rhonchi. He has no rales.   Musculoskeletal: Normal range of motion.         General: No swelling. Normal range of motion.      Cervical back: He exhibits no tenderness.   Lymphadenopathy:     He has no cervical adenopathy.   Neurological: no focal deficit. He is alert and oriented to person, place, and time. He exhibits normal muscle tone. Coordination normal.   Skin: Skin is warm, dry, intact, not diaphoretic, not pale and no rash. No erythema   Psychiatric: His speech is normal and behavior is normal. Judgment and thought content normal.   Nursing note and vitals reviewed.       Previous History      Review of patient's allergies indicates:  No Known Allergies    Past Medical History:   Diagnosis Date    A-fib     Anxiety disorder, unspecified     CAD (coronary artery disease)     CHF (congestive heart failure)     Chronic back pain     Chronic kidney disease, unspecified     Depression     Dizziness     Hard of hearing     Sleep apnea, unspecified     Stroke     Thyroid disease      Unspecified cataract     Unspecified dementia without behavioral disturbance     Unspecified sensorineural hearing loss     Urinary retention      Current Outpatient Medications   Medication Instructions    ascorbic acid (vitamin C) (VITAMIN C) 2,000 mg, Daily    aspirin 81 mg, Daily    cholecalciferol (vitamin D3) (VITAMIN D3) 2,000 Units, Daily    coenzyme Q10 100 mg, 2 times daily    cyanocobalamin (VITAMIN B-12) 50 mcg, Daily    ezetimibe (ZETIA) 10 mg, Daily    fish,bora,flax oils-om3,6,9no1 (OMEGA 3-6-9) 1,200 mg Cap 1 capsule, Daily    furosemide (LASIX) 20 mg, Daily    levomefolate-algal oil (L-METHYLFOLATE FORTE) 7.5-90.314 mg Cap 600 mg, Daily    levothyroxine (SYNTHROID) 50 mcg, Daily    magnesium oxide,aspartate,citr (TRIPLE MAGNESIUM COMPLEX) 400 mg magnesium Cap 1 tablet, Daily    memantine (NAMENDA) 10 mg, 2 times daily    MULTAQ 400 mg Tab 1 tablet, 2 times daily    nitroGLYCERIN (NITROSTAT) 0.4 MG SL tablet 1 tablet, As needed (PRN)    promethazine-dextromethorphan (PROMETHAZINE-DM) 6.25-15 mg/5 mL Syrp 5 mLs, Oral, Every 8 hours PRN    sertraline (ZOLOFT) 100 mg, Daily    tamsulosin (FLOMAX) 0.8 mg, Daily    vitamin E acid succinate (VITAMIN E SUCCINATE) 134 mg (200 unit) Tab 1 tablet, Daily     Past Surgical History:   Procedure Laterality Date    ABLATION      AORTIC VALVE REPLACEMENT      BELPHAROPTOSIS REPAIR Bilateral 02/23/2024    Procedure: BILATERAL  PTOSIS REPAIR;  Surgeon: Valdo Aguero MD;  Location: [facility];  Service: ENT;  Laterality: Bilateral;    CARDIAC SURGERY      CAROTID ENDARTERECTOMY Right 2010    Workman    COLON RESECTION  2023    COLON SURGERY      EXTRACTION OF CATARACT Left 10/11/2022    Dr. Wells    FISTULA REPAIR      FLEXIBLE LARYNGOSCOPY WITH DRUG-INDUCED SEDATION FOR EVALUATION OF SLEEP APNEA      FLEXIBLE LARYNGOSCOPY WITH DRUG-INDUCED SEDATION FOR EVALUATION OF SLEEP APNEA      Quadruple bypass      ROTATOR CUFF REPAIR Bilateral     TONSILLECTOMY       "TONSILLECTOMY AND ADENOIDECTOMY      TOTAL KNEE REPLACEMENT USING COMPUTER NAVIGATION Bilateral      Family History   Problem Relation Name Age of Onset    Heart disease Mother      Heart disease Father      Heart disease Sister      Stroke Sister         Social History     Tobacco Use    Smoking status: Never    Smokeless tobacco: Never   Substance Use Topics    Alcohol use: Never    Drug use: Never        Physical Exam      Vital Signs Reviewed   BP (!) 155/89   Pulse 90   Temp 98.2 °F (36.8 °C)   Resp 20   Ht 5' 5" (1.651 m)   Wt 77.6 kg (171 lb)   SpO2 97%   BMI 28.46 kg/m²        Procedures    Procedures     Labs     Results for orders placed or performed in visit on 12/27/24   POCT Influenza A/B MOLECULAR    Collection Time: 12/27/24 10:25 AM   Result Value Ref Range    POC Molecular Influenza A Ag Negative Negative    POC Molecular Influenza B Ag Negative Negative     Acceptable Yes    SARS Coronavirus 2 Antigen, POCT Manual Read    Collection Time: 12/27/24 10:26 AM   Result Value Ref Range    SARS Coronavirus 2 Antigen Negative Negative     Acceptable Yes          Assessment:     1. Nasal congestion    2. Acute cough        Plan:   Patient resting comfortably in room with no respiratory distress.  Discussed negative COVID negative influenza testing today though concern for suspected viral URI.  Discussed discharge plan with symptomatic Rx in addition to recommended OTC plan to avoid steroids or stimulant decongestants to avoid atrial fibrillation cardiac conflict.  Patient verbalized understanding and is ready for discharge now.    High concern for acute viral upper respiratory illness today.  Negative COVID, negative flu testing today.    May return in the next 48 hours if repeat testing needed.  Encouraged plenty of water and noncarbonated fluids over the next 3-5 days.  Cover cough at all times washing sanitized hands and surfaces regularly.  May continued daily non " sedating antihistamine Claritin Zyrtec or loratadine daily over the next 1-2 weeks with Benadryl if needed for severe allergies.  Recommend Phenergan DM as needed for cough congestion body aches and rest.  Recommend Coricidin decongestant if needed for cough and congestion with 3 day limited Afrin or Chadd-Synephrine nasal spray.    Recommend follow-up with primary care in 1-2 weeks for re-evaluation if not improving or re-contact urgent care in the next week.  Recommended emergency department evaluation immediately if respiratory symptoms worsen or chest pain develops.  Nasal congestion  -     POCT Influenza A/B MOLECULAR  -     SARS Coronavirus 2 Antigen, POCT Manual Read    Acute cough  -     POCT Influenza A/B MOLECULAR  -     SARS Coronavirus 2 Antigen, POCT Manual Read    Other orders  -     promethazine-dextromethorphan (PROMETHAZINE-DM) 6.25-15 mg/5 mL Syrp; Take 5 mLs by mouth every 8 (eight) hours as needed (cough).  Dispense: 118 mL; Refill: 0

## 2024-12-27 NOTE — PATIENT INSTRUCTIONS
High concern for acute viral upper respiratory illness today.  Negative COVID, negative flu testing today.    May return in the next 48 hours if repeat testing needed.  Encouraged plenty of water and noncarbonated fluids over the next 3-5 days.  Cover cough at all times washing sanitized hands and surfaces regularly.  May continued daily non sedating antihistamine Claritin Zyrtec or loratadine daily over the next 1-2 weeks with Benadryl if needed for severe allergies.  Recommend Phenergan DM as needed for cough congestion body aches and rest.  Recommend Coricidin decongestant if needed for cough and congestion with 3 day limited Afrin or Chadd-Synephrine nasal spray.    Recommend follow-up with primary care in 1-2 weeks for re-evaluation if not improving or re-contact urgent care in the next week.  Recommended emergency department evaluation immediately if respiratory symptoms worsen or chest pain develops.

## 2024-12-28 ENCOUNTER — TELEPHONE (OUTPATIENT)
Dept: URGENT CARE | Facility: CLINIC | Age: 84
End: 2024-12-28
Payer: MEDICARE

## 2024-12-28 RX ORDER — AZITHROMYCIN 250 MG/1
TABLET, FILM COATED ORAL
Qty: 6 TABLET | Refills: 0 | Status: SHIPPED | OUTPATIENT
Start: 2024-12-28 | End: 2025-01-02

## 2024-12-28 RX ORDER — OSELTAMIVIR PHOSPHATE 75 MG/1
75 CAPSULE ORAL 2 TIMES DAILY
Qty: 10 CAPSULE | Refills: 0 | Status: SHIPPED | OUTPATIENT
Start: 2024-12-28 | End: 2025-01-02

## 2024-12-28 NOTE — TELEPHONE ENCOUNTER
Spoke to pt and his wife, informed them about provider's request and Rx's that were sent to pharmacy; pt verbalized understanding and had no further questions or concerns.